# Patient Record
Sex: FEMALE | Race: WHITE | NOT HISPANIC OR LATINO | URBAN - METROPOLITAN AREA
[De-identification: names, ages, dates, MRNs, and addresses within clinical notes are randomized per-mention and may not be internally consistent; named-entity substitution may affect disease eponyms.]

---

## 2022-02-21 ENCOUNTER — EMERGENCY (EMERGENCY)
Facility: HOSPITAL | Age: 72
LOS: 0 days | Discharge: HOME | End: 2022-02-21
Attending: EMERGENCY MEDICINE
Payer: MEDICARE

## 2022-02-21 VITALS
RESPIRATION RATE: 18 BRPM | SYSTOLIC BLOOD PRESSURE: 172 MMHG | TEMPERATURE: 98 F | OXYGEN SATURATION: 100 % | HEIGHT: 63 IN | WEIGHT: 110.01 LBS | DIASTOLIC BLOOD PRESSURE: 100 MMHG | HEART RATE: 89 BPM

## 2022-02-21 DIAGNOSIS — K21.9 GASTRO-ESOPHAGEAL REFLUX DISEASE WITHOUT ESOPHAGITIS: ICD-10-CM

## 2022-02-21 DIAGNOSIS — R07.89 OTHER CHEST PAIN: ICD-10-CM

## 2022-02-21 DIAGNOSIS — R06.02 SHORTNESS OF BREATH: ICD-10-CM

## 2022-02-21 DIAGNOSIS — Z20.822 CONTACT WITH AND (SUSPECTED) EXPOSURE TO COVID-19: ICD-10-CM

## 2022-02-21 LAB
ALBUMIN SERPL ELPH-MCNC: 3.8 G/DL — SIGNIFICANT CHANGE UP (ref 3.3–5)
ALP SERPL-CCNC: 38 U/L — LOW (ref 40–120)
ALT FLD-CCNC: 21 U/L — SIGNIFICANT CHANGE UP (ref 12–78)
ANION GAP SERPL CALC-SCNC: 6 MMOL/L — SIGNIFICANT CHANGE UP (ref 5–17)
AST SERPL-CCNC: 14 U/L — LOW (ref 15–37)
BASOPHILS # BLD AUTO: 0.04 K/UL — SIGNIFICANT CHANGE UP (ref 0–0.2)
BASOPHILS NFR BLD AUTO: 0.6 % — SIGNIFICANT CHANGE UP (ref 0–2)
BILIRUB SERPL-MCNC: 0.6 MG/DL — SIGNIFICANT CHANGE UP (ref 0.2–1.2)
BUN SERPL-MCNC: 14 MG/DL — SIGNIFICANT CHANGE UP (ref 7–23)
CALCIUM SERPL-MCNC: 9.2 MG/DL — SIGNIFICANT CHANGE UP (ref 8.5–10.1)
CHLORIDE SERPL-SCNC: 93 MMOL/L — LOW (ref 96–108)
CO2 SERPL-SCNC: 28 MMOL/L — SIGNIFICANT CHANGE UP (ref 22–31)
CREAT SERPL-MCNC: 0.6 MG/DL — SIGNIFICANT CHANGE UP (ref 0.5–1.3)
EOSINOPHIL # BLD AUTO: 0.06 K/UL — SIGNIFICANT CHANGE UP (ref 0–0.5)
EOSINOPHIL NFR BLD AUTO: 0.8 % — SIGNIFICANT CHANGE UP (ref 0–6)
GLUCOSE SERPL-MCNC: 106 MG/DL — HIGH (ref 70–99)
HCT VFR BLD CALC: 37 % — SIGNIFICANT CHANGE UP (ref 34.5–45)
HGB BLD-MCNC: 12.7 G/DL — SIGNIFICANT CHANGE UP (ref 11.5–15.5)
IMM GRANULOCYTES NFR BLD AUTO: 0.3 % — SIGNIFICANT CHANGE UP (ref 0–1.5)
LYMPHOCYTES # BLD AUTO: 2.36 K/UL — SIGNIFICANT CHANGE UP (ref 1–3.3)
LYMPHOCYTES # BLD AUTO: 32.6 % — SIGNIFICANT CHANGE UP (ref 13–44)
MAGNESIUM SERPL-MCNC: 2.1 MG/DL — SIGNIFICANT CHANGE UP (ref 1.6–2.6)
MCHC RBC-ENTMCNC: 30.5 PG — SIGNIFICANT CHANGE UP (ref 27–34)
MCHC RBC-ENTMCNC: 34.3 GM/DL — SIGNIFICANT CHANGE UP (ref 32–36)
MCV RBC AUTO: 88.7 FL — SIGNIFICANT CHANGE UP (ref 80–100)
MONOCYTES # BLD AUTO: 0.58 K/UL — SIGNIFICANT CHANGE UP (ref 0–0.9)
MONOCYTES NFR BLD AUTO: 8 % — SIGNIFICANT CHANGE UP (ref 2–14)
NEUTROPHILS # BLD AUTO: 4.18 K/UL — SIGNIFICANT CHANGE UP (ref 1.8–7.4)
NEUTROPHILS NFR BLD AUTO: 57.7 % — SIGNIFICANT CHANGE UP (ref 43–77)
PLATELET # BLD AUTO: 261 K/UL — SIGNIFICANT CHANGE UP (ref 150–400)
POTASSIUM SERPL-MCNC: 3.8 MMOL/L — SIGNIFICANT CHANGE UP (ref 3.5–5.3)
POTASSIUM SERPL-SCNC: 3.8 MMOL/L — SIGNIFICANT CHANGE UP (ref 3.5–5.3)
PROT SERPL-MCNC: 7.1 GM/DL — SIGNIFICANT CHANGE UP (ref 6–8.3)
RBC # BLD: 4.17 M/UL — SIGNIFICANT CHANGE UP (ref 3.8–5.2)
RBC # FLD: 12.5 % — SIGNIFICANT CHANGE UP (ref 10.3–14.5)
SODIUM SERPL-SCNC: 127 MMOL/L — LOW (ref 135–145)
TROPONIN I, HIGH SENSITIVITY RESULT: 3.82 NG/L — SIGNIFICANT CHANGE UP
TROPONIN I, HIGH SENSITIVITY RESULT: 5.78 NG/L — SIGNIFICANT CHANGE UP
WBC # BLD: 7.24 K/UL — SIGNIFICANT CHANGE UP (ref 3.8–10.5)
WBC # FLD AUTO: 7.24 K/UL — SIGNIFICANT CHANGE UP (ref 3.8–10.5)

## 2022-02-21 NOTE — ED STATDOCS - CLINICAL SUMMARY MEDICAL DECISION MAKING FREE TEXT BOX
Atypical CP. No significant cardiac factors. Will get 2 sets cardiac enzymes, EKG, CXR, and reassess. 09-Feb-2020 12:21

## 2022-02-21 NOTE — ED STATDOCS - OBJECTIVE STATEMENT
70 y/o female with a PMHx of GERD and OP presents to the ED c/o left sided exertional chest pressure since 02/16/2022. Pt reports CP was intermittent but has become constant today. Pressure is worse with exertion. Pt was able to see her PCP on 02/17 and had blood work, EKG, and CT which showed no PE. Denies SOB and any other symptoms. Denies smoking and illicit drug use. No other complaints at this time. PCP: Dr. Shaikh

## 2022-02-21 NOTE — ED ADULT TRIAGE NOTE - WEIGHT METHOD
Last Appointment   2/16/2021  Next Appointment  5/12/2021    Patient stopped cholesterol medication, muscle cramping still continuing. Please advise. stated

## 2022-02-21 NOTE — ED STATDOCS - PROGRESS NOTE DETAILS
72 yo female with osteoporosis, GERD presents with cp/sob x 1 month. Pt has been followed by her PMD and had labs, CTA chest, pulmonary functions tests performed which came back unremarkable. Pt was referred to pulmonology for incidental findings and has an appointment in April. Pt states she started to feel the pain today after lunch, which became constant, which prompted her visit. Will check labs, CXR, EKG, CEx2 and reeval. -Diego Matthews PA-C Labs including 2CE unremarkable, except slightly lower Na of 127. Discussed with pt. Advised to f/u with pmd and to obtain cardiologist that usually works within that group. Will recommend one as well. Pt to be d/c home. -Diego Matthews PA-C

## 2022-02-21 NOTE — ED STATDOCS - ATTENDING CONTRIBUTION TO CARE
I, Anibal Hoffmann, performed the initial face to face bedside interview with this patient regarding history of present illness, review of symptoms and relevant past medical, social and family history.  I completed an independent physical examination.  I was the initial provider who evaluated this patient. I have signed out the follow up of any pending tests (i.e. labs, radiological studies) to the ACP.  I have communicated the patient’s plan of care and disposition with the ACP.  The history, relevant review of systems, past medical and surgical history, medical decision making, and physical examination was documented by the scribe in my presence and I attest to the accuracy of the documentation.

## 2022-02-21 NOTE — ED ADULT NURSE NOTE - OBJECTIVE STATEMENT
Pt presents to the ER for evaluation of intermittent left chest pressure x 1 month, felt worse today. Denies SOB. No rx PTA. outpatient imaging performed which was negative for pulmonary embolus.

## 2022-02-21 NOTE — ED STATDOCS - PATIENT PORTAL LINK FT
You can access the FollowMyHealth Patient Portal offered by [facility] by registering at the following website: [email or web address]. By joining [employer]’s FollowMyHealth portal, you will also be able to view your health information using other applications (apps) compatible with our system.

## 2022-02-21 NOTE — ED ADULT TRIAGE NOTE - CHIEF COMPLAINT QUOTE
Pt presents to the ED c/o intermittent left chest pressure for about a month, worsening today. Denies SOB. Denies taking medications PTA. Pt had CT scan outpatient that showed no evidence of PE.

## 2022-02-22 LAB — SARS-COV-2 RNA SPEC QL NAA+PROBE: SIGNIFICANT CHANGE UP

## 2022-11-15 ENCOUNTER — INPATIENT (INPATIENT)
Facility: HOSPITAL | Age: 72
LOS: 3 days | Discharge: HOME | DRG: 315 | End: 2022-11-19
Attending: HOSPITALIST | Admitting: HOSPITALIST
Payer: MEDICARE

## 2022-11-15 VITALS — HEIGHT: 63 IN | WEIGHT: 111.99 LBS

## 2022-11-15 DIAGNOSIS — Z98.890 OTHER SPECIFIED POSTPROCEDURAL STATES: Chronic | ICD-10-CM

## 2022-11-15 DIAGNOSIS — R00.0 TACHYCARDIA, UNSPECIFIED: ICD-10-CM

## 2022-11-15 PROBLEM — K21.9 GASTRO-ESOPHAGEAL REFLUX DISEASE WITHOUT ESOPHAGITIS: Chronic | Status: ACTIVE | Noted: 2022-02-21

## 2022-11-15 LAB
ALBUMIN SERPL ELPH-MCNC: 2.8 G/DL — LOW (ref 3.3–5)
ALP SERPL-CCNC: 131 U/L — HIGH (ref 40–120)
ALT FLD-CCNC: 86 U/L — HIGH (ref 12–78)
ANION GAP SERPL CALC-SCNC: 4 MMOL/L — LOW (ref 5–17)
AST SERPL-CCNC: 24 U/L — SIGNIFICANT CHANGE UP (ref 15–37)
BASOPHILS # BLD AUTO: 0.02 K/UL — SIGNIFICANT CHANGE UP (ref 0–0.2)
BASOPHILS NFR BLD AUTO: 0.2 % — SIGNIFICANT CHANGE UP (ref 0–2)
BILIRUB SERPL-MCNC: 0.5 MG/DL — SIGNIFICANT CHANGE UP (ref 0.2–1.2)
BUN SERPL-MCNC: 13 MG/DL — SIGNIFICANT CHANGE UP (ref 7–23)
CALCIUM SERPL-MCNC: 9.2 MG/DL — SIGNIFICANT CHANGE UP (ref 8.5–10.1)
CHLORIDE SERPL-SCNC: 95 MMOL/L — LOW (ref 96–108)
CO2 SERPL-SCNC: 31 MMOL/L — SIGNIFICANT CHANGE UP (ref 22–31)
CREAT SERPL-MCNC: 0.61 MG/DL — SIGNIFICANT CHANGE UP (ref 0.5–1.3)
EGFR: 97 ML/MIN/1.73M2 — SIGNIFICANT CHANGE UP
EOSINOPHIL # BLD AUTO: 0.1 K/UL — SIGNIFICANT CHANGE UP (ref 0–0.5)
EOSINOPHIL NFR BLD AUTO: 1 % — SIGNIFICANT CHANGE UP (ref 0–6)
FLUAV AG NPH QL: SIGNIFICANT CHANGE UP
FLUBV AG NPH QL: SIGNIFICANT CHANGE UP
GLUCOSE SERPL-MCNC: 95 MG/DL — SIGNIFICANT CHANGE UP (ref 70–99)
HCT VFR BLD CALC: 28.4 % — LOW (ref 34.5–45)
HGB BLD-MCNC: 9.7 G/DL — LOW (ref 11.5–15.5)
IMM GRANULOCYTES NFR BLD AUTO: 0.8 % — SIGNIFICANT CHANGE UP (ref 0–0.9)
LYMPHOCYTES # BLD AUTO: 1.68 K/UL — SIGNIFICANT CHANGE UP (ref 1–3.3)
LYMPHOCYTES # BLD AUTO: 17.4 % — SIGNIFICANT CHANGE UP (ref 13–44)
MAGNESIUM SERPL-MCNC: 2.1 MG/DL — SIGNIFICANT CHANGE UP (ref 1.6–2.6)
MCHC RBC-ENTMCNC: 30.8 PG — SIGNIFICANT CHANGE UP (ref 27–34)
MCHC RBC-ENTMCNC: 34.2 GM/DL — SIGNIFICANT CHANGE UP (ref 32–36)
MCV RBC AUTO: 90.2 FL — SIGNIFICANT CHANGE UP (ref 80–100)
MONOCYTES # BLD AUTO: 1.1 K/UL — HIGH (ref 0–0.9)
MONOCYTES NFR BLD AUTO: 11.4 % — SIGNIFICANT CHANGE UP (ref 2–14)
NEUTROPHILS # BLD AUTO: 6.67 K/UL — SIGNIFICANT CHANGE UP (ref 1.8–7.4)
NEUTROPHILS NFR BLD AUTO: 69.2 % — SIGNIFICANT CHANGE UP (ref 43–77)
PLATELET # BLD AUTO: 288 K/UL — SIGNIFICANT CHANGE UP (ref 150–400)
POTASSIUM SERPL-MCNC: 4.1 MMOL/L — SIGNIFICANT CHANGE UP (ref 3.5–5.3)
POTASSIUM SERPL-SCNC: 4.1 MMOL/L — SIGNIFICANT CHANGE UP (ref 3.5–5.3)
PROT SERPL-MCNC: 6.4 GM/DL — SIGNIFICANT CHANGE UP (ref 6–8.3)
RBC # BLD: 3.15 M/UL — LOW (ref 3.8–5.2)
RBC # FLD: 13.2 % — SIGNIFICANT CHANGE UP (ref 10.3–14.5)
RSV RNA NPH QL NAA+NON-PROBE: SIGNIFICANT CHANGE UP
SARS-COV-2 RNA SPEC QL NAA+PROBE: SIGNIFICANT CHANGE UP
SODIUM SERPL-SCNC: 130 MMOL/L — LOW (ref 135–145)
TROPONIN I, HIGH SENSITIVITY RESULT: 616.89 NG/L — HIGH
TROPONIN I, HIGH SENSITIVITY RESULT: 654.14 NG/L — HIGH
TROPONIN I, HIGH SENSITIVITY RESULT: 681.2 NG/L — HIGH
WBC # BLD: 9.65 K/UL — SIGNIFICANT CHANGE UP (ref 3.8–10.5)
WBC # FLD AUTO: 9.65 K/UL — SIGNIFICANT CHANGE UP (ref 3.8–10.5)

## 2022-11-15 RX ORDER — SODIUM CHLORIDE 9 MG/ML
500 INJECTION INTRAMUSCULAR; INTRAVENOUS; SUBCUTANEOUS ONCE
Refills: 0 | Status: COMPLETED | OUTPATIENT
Start: 2022-11-15 | End: 2022-11-15

## 2022-11-15 RX ORDER — METOPROLOL TARTRATE 50 MG
25 TABLET ORAL
Refills: 0 | Status: DISCONTINUED | OUTPATIENT
Start: 2022-11-15 | End: 2022-11-16

## 2022-11-15 RX ORDER — DILTIAZEM HCL 120 MG
10 CAPSULE, EXT RELEASE 24 HR ORAL EVERY 4 HOURS
Refills: 0 | Status: DISCONTINUED | OUTPATIENT
Start: 2022-11-15 | End: 2022-11-19

## 2022-11-15 RX ORDER — METOPROLOL TARTRATE 50 MG
25 TABLET ORAL ONCE
Refills: 0 | Status: COMPLETED | OUTPATIENT
Start: 2022-11-15 | End: 2022-11-15

## 2022-11-15 RX ORDER — ACETAMINOPHEN 500 MG
650 TABLET ORAL EVERY 6 HOURS
Refills: 0 | Status: DISCONTINUED | OUTPATIENT
Start: 2022-11-15 | End: 2022-11-19

## 2022-11-15 RX ORDER — ASPIRIN/CALCIUM CARB/MAGNESIUM 324 MG
325 TABLET ORAL ONCE
Refills: 0 | Status: COMPLETED | OUTPATIENT
Start: 2022-11-15 | End: 2022-11-15

## 2022-11-15 RX ORDER — MAGNESIUM CARBONATE 54 MG/5 ML
1 LIQUID (ML) ORAL
Qty: 0 | Refills: 0 | DISCHARGE

## 2022-11-15 RX ORDER — APIXABAN 2.5 MG/1
2.5 TABLET, FILM COATED ORAL ONCE
Refills: 0 | Status: COMPLETED | OUTPATIENT
Start: 2022-11-15 | End: 2022-11-15

## 2022-11-15 RX ORDER — FAMOTIDINE 10 MG/ML
20 INJECTION INTRAVENOUS DAILY
Refills: 0 | Status: DISCONTINUED | OUTPATIENT
Start: 2022-11-15 | End: 2022-11-19

## 2022-11-15 RX ORDER — SENNA PLUS 8.6 MG/1
1 TABLET ORAL DAILY
Refills: 0 | Status: DISCONTINUED | OUTPATIENT
Start: 2022-11-15 | End: 2022-11-16

## 2022-11-15 RX ORDER — CHOLECALCIFEROL (VITAMIN D3) 125 MCG
1 CAPSULE ORAL
Qty: 0 | Refills: 0 | DISCHARGE

## 2022-11-15 RX ORDER — APIXABAN 2.5 MG/1
5 TABLET, FILM COATED ORAL
Refills: 0 | Status: DISCONTINUED | OUTPATIENT
Start: 2022-11-15 | End: 2022-11-19

## 2022-11-15 RX ORDER — FAMOTIDINE 10 MG/ML
1 INJECTION INTRAVENOUS
Qty: 0 | Refills: 0 | DISCHARGE

## 2022-11-15 RX ORDER — METOPROLOL TARTRATE 50 MG
25 TABLET ORAL
Refills: 0 | Status: DISCONTINUED | OUTPATIENT
Start: 2022-11-15 | End: 2022-11-15

## 2022-11-15 RX ORDER — METOPROLOL TARTRATE 50 MG
12.5 TABLET ORAL ONCE
Refills: 0 | Status: DISCONTINUED | OUTPATIENT
Start: 2022-11-15 | End: 2022-11-15

## 2022-11-15 RX ORDER — ONDANSETRON 8 MG/1
4 TABLET, FILM COATED ORAL EVERY 6 HOURS
Refills: 0 | Status: DISCONTINUED | OUTPATIENT
Start: 2022-11-15 | End: 2022-11-19

## 2022-11-15 RX ORDER — LANOLIN ALCOHOL/MO/W.PET/CERES
3 CREAM (GRAM) TOPICAL AT BEDTIME
Refills: 0 | Status: DISCONTINUED | OUTPATIENT
Start: 2022-11-15 | End: 2022-11-19

## 2022-11-15 RX ADMIN — APIXABAN 5 MILLIGRAM(S): 2.5 TABLET, FILM COATED ORAL at 21:03

## 2022-11-15 RX ADMIN — APIXABAN 2.5 MILLIGRAM(S): 2.5 TABLET, FILM COATED ORAL at 16:03

## 2022-11-15 RX ADMIN — Medication 25 MILLIGRAM(S): at 21:04

## 2022-11-15 NOTE — H&P ADULT - ASSESSMENT
Patient is a 71 y o female with a PMH of MGUS, osteoporosis and atrial myxoma s/p open heart surgery on 11/8/2022 presenting from Cardiologist office for atrial flutter and tachycardia.  Patients  is bedside and contributed to conversation.  She was at [facility] from 11/8/2022-11/14/2022, she had open heart surgery to remove atrial myxoma.  Today she was fatigued and had a HR of 124 prompting her to see Dr. Kaye.  She made an urgent appointment to see Dr. Kaye her cardiologist who found her to be in atrial flutter, gave her PO Metoprolol and sent her to the ED.  While in the ED, EKG showed sinus tachycardia and her HR has been 100-130s.  She was found to elevated troponins >600.  Dr. Kaye recommended Metoprolol 25 mg BID and Eliquis 2.5 mg BID.      1. Tachycardia, Atrial flutter  -Post op day 7 atrial myxoma removal at [facility]  - at home, given Metoprolol 25 PO by Dr. Kaye prior to ED  -HR 100s-130s in ED  -EKG: Sinus tachycardia @ 121 bpm, NAD, nml QTc 465, T wave inversion II, III, avf, V3, V6  -CXR unremarkable  -Troponin 654.14  -CHADs Vasc: 2  -Fu Cardiology consult  -Trend troponins  -AM EKG ordered  -Start patient on Metoprolol 25 mg BID  -Start patient on Eliquis 2.5 mg BID    2. Anemia  -Hgb 9.7 Hct: 28.4  -Iron, TIBC, ferritin ordered  -Likely due to recent surgical procedure    3. Hyponatremia  -Na 130, discharged from [place] with same level  -Repeat level in AM ordered    4. GERD  -Continue home medication Famotidine 20 mg QD    Code status: Full Code  Diet: Regular  No PT needs at this time Patient is a 71 y o female with a PMH of MGUS, osteoporosis and atrial myxoma s/p open heart surgery on 11/8/2022 presenting from Cardiologist office for atrial flutter and tachycardia.  Patients  is bedside and contributed to conversation.  She was at [facility] from 11/8/2022-11/14/2022, she had open heart surgery to remove atrial myxoma.  Today she was fatigued and had a HR of 124 prompting her to see Dr. Kaye.  She made an urgent appointment to see Dr. Kaye her cardiologist who found her to be in atrial flutter, gave her PO Metoprolol and sent her to the ED.  While in the ED, EKG showed sinus tachycardia and her HR has been 100-130s.  She was found to elevated troponins >600.  Dr. Kaye recommended Metoprolol 25 mg BID and Eliquis 2.5 mg BID.      1. Tachycardia, Atrial flutter  -Post op day 7 atrial myxoma removal at [facility]  - at home, given Metoprolol 25 PO by Dr. Kaye prior to ED  -HR 100s-130s in ED  -EKG: Sinus tachycardia @ 121 bpm, NAD, nml QTc 465, T wave inversion II, III, avf, V3, V6  -CXR unremarkable  -Troponin 654.14, 681  -CHADs Vasc: 2  -Appreciate Cardiology consult, spoke with attending and plan incorporated below  -Trend troponins  -AM EKG ordered  -Start patient on Metoprolol 25 mg BID  -Start patient on Eliquis 5 mg BID  -Planned Cardioversion in AM  -NPO after midnight    2. Anemia  -Hgb 9.7 Hct: 28.4  -Iron, TIBC, ferritin ordered  -Likely due to recent surgical procedure    3. Hyponatremia  -Na 130, discharged from [place] with same level  -Repeat level in AM ordered    4. GERD  -Continue home medication Famotidine 20 mg QD    Code status: Full Code  Diet: Regular  No PT needs at this time Patient is a 71 y o female with a PMH of MGUS, osteoporosis and atrial myxoma s/p open heart surgery on 11/8/2022 presenting from Cardiologist office for atrial flutter and tachycardia.  Patients  is bedside and contributed to conversation.  She was at [facility] from 11/8/2022-11/14/2022, she had open heart surgery to remove atrial myxoma.  Today she was fatigued and had a HR of 124 prompting her to see Dr. Kaye.  She made an urgent appointment to see Dr. Kaye her cardiologist who found her to be in atrial flutter, gave her PO Metoprolol and sent her to the ED.  While in the ED, EKG showed sinus tachycardia and her HR has been 100-130s.  She was found to elevated troponins >600.  Dr. Kaye recommended Metoprolol 25 mg BID and Eliquis 2.5 mg BID.      1. Tachycardia, Atrial flutter  -Post op day 7 atrial myxoma removal at [facility]  - at home, given Metoprolol 25 PO by Dr. Kaye prior to ED  -HR 100s-130s in ED  -EKG: Sinus tachycardia @ 121 bpm, NAD, nml QTc 465, T wave inversion II, III, avf, V3, V6  -CXR unremarkable  -Troponin 654.14, 681  -CHADs Vasc: 2  -Appreciate Cardiology consult, spoke with attending and plan incorporated below  -Trend troponins  -AM EKG ordered  -Start patient on Metoprolol 25 mg BID with holding parameters  -Cardizem 10 mg IV push for HR>130  -Start patient on Eliquis 5 mg BID  -Planned Cardioversion in AM  -NPO after midnight    2. Anemia  -Hgb 9.7 Hct: 28.4  -Iron, TIBC, ferritin ordered  -Likely due to recent surgical procedure    3. Hyponatremia  -Na 130, discharged from [place] with same level  -Repeat level in AM ordered    4. GERD  -Continue home medication Famotidine 20 mg QD    Code status: Full Code  Diet: Regular  No PT needs at this time Patient is a 71 y o female with a PMH of MGUS, osteoporosis and atrial myxoma s/p open heart surgery on 11/8/2022 presenting from Cardiologist office for atrial flutter and tachycardia.  Patients  is bedside and contributed to conversation.  She was at [facility] from 11/8/2022-11/14/2022, she had open heart surgery to remove atrial myxoma.  Today she was fatigued and had a HR of 124 prompting her to see Dr. Kaye.  She made an urgent appointment to see Dr. Kaye her cardiologist who found her to be in atrial flutter, gave her PO Metoprolol and sent her to the ED.  While in the ED, EKG showed sinus tachycardia and her HR has been 100-130s.  She was found to elevated troponins >600.  Dr. Kaye recommended Metoprolol 25 mg BID and Eliquis 2.5 mg BID.      1. Tachycardia, Atrial flutter  -Post op day 7 atrial myxoma removal at [facility]  - at home, given Metoprolol 25 PO by Dr. Kaye prior to ED  -HR 100s-130s in ED  -EKG: Sinus tachycardia @ 121 bpm, NAD, nml QTc 465, T wave inversion II, III, avf, V3, V6  -CXR unremarkable  -Troponin 654.14, 681  -CHADs Vasc: 2  -Appreciate Cardiology consult, spoke with attending and plan incorporated below  -Trend troponins  -AM EKG ordered  -Start patient on Metoprolol 25 mg BID with holding parameters  -Cardizem 10 mg IV push for HR>130  -Start patient on Eliquis 5 mg BID as patient does not meet criteria for lower dose, discussed with Dr. Kelly  -Planned Cardioversion in AM  -NPO after midnight    2. Anemia  -Hgb 9.7 Hct: 28.4  -Iron, TIBC, ferritin ordered  -Likely due to recent surgical procedure    3. Hyponatremia  -Na 130, discharged from [place] with same level  -Repeat level in AM ordered    4. GERD  -Continue home medication Famotidine 20 mg QD    Code status: Full Code  Diet: Regular  No PT needs at this time Patient is a 71 y o female with a PMH of MGUS, osteoporosis and atrial myxoma s/p open heart surgery on 11/8/2022 presenting from Cardiologist office for atrial flutter and tachycardia.  Patients  is bedside and contributed to conversation.  She was at [facility] from 11/8/2022-11/14/2022, she had open heart surgery to remove atrial myxoma.  Today she was fatigued and had a HR of 124 prompting her to see Dr. Kaye.  She made an urgent appointment to see Dr. Kaye her cardiologist who found her to be in atrial flutter, gave her PO Metoprolol and sent her to the ED.  While in the ED, EKG showed sinus tachycardia and her HR has been 100-130s.  She was found to elevated troponins >600 x 2.  Dr. Kaye recommended Metoprolol 25 mg BID and Eliquis 2.5 mg BID.  EKG shows Sinus tachycardia @ 121 bpm, NAD, nml QTc 465, T wave inversion II, III, avf, V3, V6.  Chads vasc score of 2.        1. Tachycardia, Atrial flutter  -Appreciate Cardiology consult, spoke with Dr. Kelly, recommended:  --Start patient on Metoprolol 25 mg BID with holding parameters  --Cardizem 10 mg IV push for HR>130  --Start patient on Eliquis 5 mg BID as patient does not meet criteria for lower dose  --Planned Cardioversion in AM  -NPO after midnight  -AM EKG ordered    2. Elevated troponin  -Trend troponins  -Likely due to recent open heart surgery or demand ischemia from atrial flutter  -Doubt ACS, patient denies chest pain, SOB, diaphoresis, and EKG is not indicative of ACS  -Cardiology input appreciated    2. Anemia  -Hgb 9.7 Hct: 28.4  -Iron, TIBC, ferritin ordered  -Likely due to recent surgical procedure    3. Hyponatremia  -Na 130, discharged from [place] with same level  -Patient asymptomatic  -Repeat level in AM ordered    4. GERD  -Continue home medication Famotidine 20 mg QD    Code status: Full Code  Diet: Regular  No PT needs at this time

## 2022-11-15 NOTE — ED PROVIDER NOTE - NSICDXPASTMEDICALHX_GEN_ALL_CORE_FT
PAST MEDICAL HISTORY:  GERD (gastroesophageal reflux disease)       Atrial myxoma     Osteoporosis

## 2022-11-15 NOTE — H&P ADULT - NSHPREVIEWOFSYSTEMS_GEN_ALL_CORE
REVIEW OF SYSTEMS:    CONSTITUTIONAL: No weakness, fevers or chills  EYES/ENT: No visual changes;  No vertigo or throat pain   NECK: No pain or stiffness  RESPIRATORY: No cough, wheezing, hemoptysis; No shortness of breath  CARDIOVASCULAR: No chest pain or palpitations  GASTROINTESTINAL: No abdominal or epigastric pain. No nausea, vomiting, or hematemesis; No diarrhea or constipation. No melena or hematochezia.  GENITOURINARY: No dysuria, frequency or hematuria  NEUROLOGICAL: No numbness or weakness  SKIN: No itching, rashes REVIEW OF SYSTEMS:    CONSTITUTIONAL: No weakness, fevers or chills  EYES/ENT: No visual changes;  No vertigo or throat pain   NECK: No pain or stiffness  RESPIRATORY: No cough, wheezing, hemoptysis; No shortness of breath  CARDIOVASCULAR: Elevated HR, No chest pain or palpitations  GASTROINTESTINAL: No abdominal or epigastric pain. No nausea, vomiting, or hematemesis; No diarrhea or constipation. No melena or hematochezia.  GENITOURINARY: No dysuria, frequency or hematuria  NEUROLOGICAL: No numbness or weakness  SKIN: bruises on R UE from AC injections

## 2022-11-15 NOTE — H&P ADULT - HISTORY OF PRESENT ILLNESS
Patient is a 71 y o female with a PMH of osteoporosis and atrial myxoma s/p open heart surgery done on 11/8/2022, discharged last night.    Dr. Braun  States to start the patient on metoprolol 25 mg twice daily and Eliquis 2.5 mg twice daily. Patient is a 71 y o female with a PMH of Igm MGUS, osteoporosis and atrial myxoma s/p open heart surgery done on 11/8/2022, discharged last night.      Patient had open heart surgery to remove atrial myxoma.  She states that her heart rate was more elevated than usual.  She was discharged yesterday, got home, took a shower.  124 hr at home. She became very fatigued this morning and HR was 124.  She made an appointment to goo see the cardiologist and was told that she was in afib.    Dr. Braun  States to start the patient on metoprolol 25 mg twice daily and Eliquis 2.5 mg twice daily.    Denies smoking, only drinks on occasion, Deneis drug use    Exercises every day avid walkers and hikers.  Diet: Low fat low carb vegetables fruits, tea/caffeine coffee in AM 2 coffee 3 cup of decaf tea daily.  3 glasses 12 ounces. water a day    Famotidine 20 AM, MVN with minerals, vit D 1000U daily ca 400 mg mg 250mg qd Patient is a 71 y o female with a PMH of MGUS, osteoporosis and atrial myxoma s/p open heart surgery on 11/8/2022 presenting from Cardiologist office for atrial flutter and tachycardia.  Patients  is bedside and contributed to conversation.  She was at [facility] from 11/8/2022-11/14/2022, she had open heart surgery to remove atrial myxoma and post surgical course included fluid overload status.  Patient states she had no complications post surgery and was discharged in stable condition.  She states that she woke up this morning and did not feel right.  Her  states that she looked so fatigued and he became concerned.  She had no other sxs at this time but noticed that her HR was 124.  She made an urgent appointment to see Dr. Kaye her cardiologist who found her to be in atrial flutter, gave her PO Metoprolol and sent her to the ED.  While in the ED, EKG showed sinus tachycardia and her HR has been 100-130s.  She was found to elevated troponins >600.  Dr. Kaye recommended Metoprolol 25 mg BID and Eliquis 2.5 mg BID.      She feels well at this time, and has no other complaints.  Labs reviewed from [place] on patients phone, she has labs available, and Na was 130 on discharge.  No troponins available for comparison.  She denies fever, chills, chest pain, SOB, dizziness, palpitations, weakness, nausea, vomiting, diarrhea, constipation, dysuria.        Exercises every day is an avid walker/ hikers.    Diet: Low fat low carb vegetables fruits, tea/caffeine coffee in AM 2 coffee 3 cup of decaf tea daily.  3 12 ounce glasses of water a day.  Smoking: Denies, Drugs: Denies, ETOH: occasional use   Patient is a 71 y o female with a PMH of MGUS, osteoporosis and atrial myxoma s/p open heart surgery on 11/8/2022 presenting from Cardiologist office for atrial flutter and tachycardia.  Patients  is bedside and contributed to conversation.  She was at [facility] from 11/8/2022-11/14/2022, she had open heart surgery to remove atrial myxoma and post surgical course included fluid overload status.  Patient states she had no complications post surgery and was discharged in stable condition.  She states that she woke up this morning and did not feel right.  Her  states that she looked so fatigued and he became concerned.  She had no other sxs at this time but noticed that her HR was 124.  She made an urgent appointment to see Dr. Kaye her cardiologist who found her to be in atrial flutter, gave her PO Metoprolol and sent her to the ED.  While in the ED, EKG showed sinus tachycardia and her HR has been 100-130s.  She was found to elevated troponins >600.  Per ED, Dr. Kaye recommended Metoprolol 25 mg BID and Eliquis 2.5 mg BID.      She feels well at this time, and has no other complaints.  Labs reviewed from [place] on patients phone, she has labs available, and Na was 130 on discharge.  No troponins available for comparison.  She denies fever, chills, chest pain, SOB, dizziness, palpitations, weakness, nausea, vomiting, diarrhea, constipation, dysuria.        Exercises every day is an avid walker/ hikers.    Diet: Low fat low carb vegetables fruits, tea/caffeine coffee in AM 2 coffee 3 cup of decaf tea daily.  3 12 ounce glasses of water a day.  Smoking: Denies, Drugs: Denies, ETOH: occasional use

## 2022-11-15 NOTE — ED ADULT NURSE NOTE - OBJECTIVE STATEMENT
pt presents to the ED stating she was sent in by her cardiologist for tachycardia. pt states she had open heart surgery last week. Pt was discharged from the hospital yesterday and went for a followup appt today. pt denies CP, SOB, dizziness at this time. Denies palpitations. Offers no other complaints at this time.

## 2022-11-15 NOTE — PATIENT PROFILE ADULT - FALL HARM RISK - HARM RISK INTERVENTIONS

## 2022-11-15 NOTE — H&P ADULT - NSHPOUTPATIENTPROVIDERS_GEN_ALL_CORE
Cardiologist Dr. Braun Cardiologist Dr. Braun  PCP: Dr Keila Shaikh  Pul: Dr. Kimball  Urology: Dr. Woods  Hematology/Onc: Dr. Chayo Rascon  Endo: Dr. Emil cope Cardiologist Dr. Braun  PCP: Dr Keila Shaikh  Pul: Dr. Kibmall  Urology: Dr. Woods  Hematology/Onc: Dr. Chayo Rascon  Rheum: Dr. Emil cope  Endo: Dr. Alina Meredith

## 2022-11-15 NOTE — ED PROVIDER NOTE - PROGRESS NOTE DETAILS
Miles Braun said pt had A-flutter in his office, wants a copy of EKG taken in ED then will call back. Dr. Braun  States to start the patient on metoprolol 25 mg twice daily and Eliquis 2.5 mg twice daily.  The patient has been admitted to the hospitalist Dr. Margaux Hoffmann, DO

## 2022-11-15 NOTE — PATIENT PROFILE ADULT - NSPROMEDSADMININFO_GEN_A_NUR
pt stated if pill is too big than she likes to cut in half and take it with apple sauce./no concerns

## 2022-11-15 NOTE — ED PROVIDER NOTE - OBJECTIVE STATEMENT
71 year old female with PMHx of osteoporosis and atrial myxoma s/p open heart surgery done on 11/8/2022, discharged last night. Pt was told to watch her heart rate: last night was 113, and this morning 124 so went to see Cardiologist Dr. Braun in office, where heart rate was in 130-140s. Dr. Braun said there is some arrythmia, gave PO Metroprolol, and sent pt into ED to have tachycardia controlled. Pt notes she has been SOB since surgery. Pt is not on ACs currently. Denies chest pain, fevers, chills, or any other complaints. Pt did not require a blood transfusion with surgery.

## 2022-11-15 NOTE — H&P ADULT - NSICDXPASTMEDICALHX_GEN_ALL_CORE_FT
PAST MEDICAL HISTORY:  Atrial myxoma s/p resection 11/08/2022    GERD (gastroesophageal reflux disease)     Osteoporosis     Thyroid nodule

## 2022-11-15 NOTE — H&P ADULT - NSHPPHYSICALEXAM_GEN_ALL_CORE
PHYSICAL EXAM:  GENERAL: NAD, lying in bed comfortably  HEAD:  Atraumatic, Normocephalic  EYES: EOMI, PERRLA, conjunctiva and sclera clear  ENT: Moist mucous membranes  NECK: Supple, No JVD  CHEST/LUNG: Clear to auscultation bilaterally; No rales, rhonchi, wheezing, or rubs. Unlabored respirations  HEART: Regular rate and rhythm; No murmurs, rubs, or gallops  ABDOMEN: Bowel sounds present; Soft, Nontender, Nondistended. No hepatomegaly  EXTREMITIES:  2+ Peripheral Pulses, brisk capillary refill. No clubbing, cyanosis, or edema  NERVOUS SYSTEM:  Alert & Oriented X3, speech clear. No deficits   MSK: FROM all 4 extremities, full and equal strength  SKIN: No rashes or lesions PHYSICAL EXAM:  GENERAL: NAD, lying in bed comfortably  HEAD:  Atraumatic, Normocephalic  EYES: PERRLA, conjunctiva and sclera clear  ENT: Moist mucous membranes  NECK: Supple, No JVD  CHEST/LUN cm well healing scar with Dermabond on top over sternum.  Clear to auscultation bilaterally; No rales, rhonchi, wheezing, or rubs. Unlabored respirations  HEART: Regular rate and rhythm; No murmurs, rubs, or gallops  ABDOMEN: 2 cm well healed scars, Bowel sounds present; Soft, Nontender, Nondistended. No hepatomegaly  EXTREMITIES:  2+ Peripheral Pulses, brisk capillary refill. No clubbing, cyanosis, or edema  NERVOUS SYSTEM:  Alert & Oriented X3, speech clear. No deficits   MSK: FROM all 4 extremities, full and equal strength  SKIN: healing bruises on RUE, No rashes or lesions PHYSICAL EXAM:  GENERAL: NAD, lying in bed comfortably  HEAD:  Atraumatic, Normocephalic  EYES: PERRLA, conjunctiva and sclera clear  ENT: Moist mucous membranes  NECK: Supple, No JVD  CHEST/LUN cm well healing scar with Dermabond on top over sternum.  Clear to auscultation bilaterally; No rales, rhonchi, wheezing, or rubs. Unlabored respirations  HEART: Tachycardic, Regular rhythm; No murmurs, rubs, or gallops  ABDOMEN: 2 cm well healed scars, Bowel sounds present; Soft, Nontender, Nondistended. No hepatomegaly  EXTREMITIES:  2+ Peripheral Pulses, brisk capillary refill. No clubbing, cyanosis, or edema  NERVOUS SYSTEM:  Alert & Oriented X3, speech clear. No deficits   MSK: FROM all 4 extremities, full and equal strength  SKIN: healing bruises on RUE, No rashes or lesions

## 2022-11-15 NOTE — PHARMACOTHERAPY INTERVENTION NOTE - COMMENTS
Medication history complete, reviewed medication with patient and  and bedside and confirmed with DrFirst.

## 2022-11-15 NOTE — ED ADULT TRIAGE NOTE - CHIEF COMPLAINT QUOTE
PT C/O "ARRHYTHMIA, HAD RECENT HEART SURGERY ON 11/8/2022,"  PT'S  EXHIBIT AGGRESSIVE BEHAVIOUR, STAFF ASKED PT TO WRITE HER INFORMATION,  STATES "YOU LISTEN YOU ME!"  CONTINUOUSLY SHOWED AGGRESSIVE MANNERS, HAD SECURITY TO GET INVOLVED TO DUE TO FEELING OF HARASSMENT FROM THE .  UPON ASKING THE PT TO TAKE OFF THE GLOVES TO CHECK THE PATIENT'S HR,  CONTINUED ON SAYING HER HANDS ARE COLD, SHE CANNOT TAKE HER GLOVES.  REQUESTED EKG UPON ARRIVAL TO ED.  UNABLE TO OBTAIN ADD'L INFORMATION DUE TO  AGGRESSIVENESS.

## 2022-11-15 NOTE — ED PROVIDER NOTE - CLINICAL SUMMARY MEDICAL DECISION MAKING FREE TEXT BOX
Plan: Cardiac workup to rule out enzyme deficiency, d-dimer to rule out PE, consult with pt's cardiologist, and give Metoprolol.

## 2022-11-15 NOTE — H&P ADULT - NSHPLABSRESULTS_GEN_ALL_CORE
LABS:               9.7    9.65  )-----------( 288      ( 15 Nov 2022 11:57 )             28.4     11-15    130<L>  |  95<L>  |  13  ----------------------------<  95  4.1   |  31  |  0.61    Ca    9.2      15 Nov 2022 11:57  Mg     2.1     11-15    TPro  6.4  /  Alb  2.8<L>  /  TBili  0.5  /  DBili  x   /  AST  24  /  ALT  86<H>  /  AlkPhos  131<H>  11-15      RADIOLOGY:  Xray Chest 1 View-PORTABLE IMMEDIATE 11.15.22    Heart size is within normal limits. Sternotomy is new since February 21   this year.  Lungs remain clear.  Clips in the right breast area again noted.  IMPRESSION: Sternotomy new since February. No acute finding.    EKG:      I have personally reviewed all lab results, imaging, and EKG results. LABS:               9.7    9.65  )-----------( 288      ( 15 Nov 2022 11:57 )             28.4     11-15    130<L>  |  95<L>  |  13  ----------------------------<  95  4.1   |  31  |  0.61    Ca    9.2      15 Nov 2022 11:57  Mg     2.1     11-15    TPro  6.4  /  Alb  2.8<L>  /  TBili  0.5  /  DBili  x   /  AST  24  /  ALT  86<H>  /  AlkPhos  131<H>  11-15      RADIOLOGY:  Xray Chest 1 View-PORTABLE IMMEDIATE 11.15.22    Heart size is within normal limits. Sternotomy is new since February 21   this year.  Lungs remain clear.  Clips in the right breast area again noted.  IMPRESSION: Sternotomy new since February. No acute finding.    EKG: Sinus tachycardia @ 121 bpm, NAD, nml QTc 465, T wave inversion II, III, avf, V3, V6    I have personally reviewed all lab results, imaging, and EKG results.

## 2022-11-16 ENCOUNTER — TRANSCRIPTION ENCOUNTER (OUTPATIENT)
Age: 72
End: 2022-11-16

## 2022-11-16 LAB
ADD ON TEST-SPECIMEN IN LAB: SIGNIFICANT CHANGE UP
ALBUMIN SERPL ELPH-MCNC: 3.2 G/DL — LOW (ref 3.3–5)
ALP SERPL-CCNC: 135 U/L — HIGH (ref 40–120)
ALT FLD-CCNC: 89 U/L — HIGH (ref 12–78)
ANION GAP SERPL CALC-SCNC: 6 MMOL/L — SIGNIFICANT CHANGE UP (ref 5–17)
AST SERPL-CCNC: 27 U/L — SIGNIFICANT CHANGE UP (ref 15–37)
BASOPHILS # BLD AUTO: 0.05 K/UL — SIGNIFICANT CHANGE UP (ref 0–0.2)
BASOPHILS NFR BLD AUTO: 0.5 % — SIGNIFICANT CHANGE UP (ref 0–2)
BILIRUB SERPL-MCNC: 0.6 MG/DL — SIGNIFICANT CHANGE UP (ref 0.2–1.2)
BUN SERPL-MCNC: 13 MG/DL — SIGNIFICANT CHANGE UP (ref 7–23)
CALCIUM SERPL-MCNC: 10.2 MG/DL — HIGH (ref 8.5–10.1)
CHLORIDE SERPL-SCNC: 97 MMOL/L — SIGNIFICANT CHANGE UP (ref 96–108)
CO2 SERPL-SCNC: 29 MMOL/L — SIGNIFICANT CHANGE UP (ref 22–31)
CREAT SERPL-MCNC: 0.64 MG/DL — SIGNIFICANT CHANGE UP (ref 0.5–1.3)
EGFR: 96 ML/MIN/1.73M2 — SIGNIFICANT CHANGE UP
EOSINOPHIL # BLD AUTO: 0.27 K/UL — SIGNIFICANT CHANGE UP (ref 0–0.5)
EOSINOPHIL NFR BLD AUTO: 2.6 % — SIGNIFICANT CHANGE UP (ref 0–6)
FERRITIN SERPL-MCNC: 210 NG/ML — HIGH (ref 15–150)
GLUCOSE SERPL-MCNC: 113 MG/DL — HIGH (ref 70–99)
HCT VFR BLD CALC: 30 % — LOW (ref 34.5–45)
HCV AB S/CO SERPL IA: 0.1 S/CO — SIGNIFICANT CHANGE UP (ref 0–0.99)
HCV AB SERPL-IMP: SIGNIFICANT CHANGE UP
HGB BLD-MCNC: 10.1 G/DL — LOW (ref 11.5–15.5)
IMM GRANULOCYTES NFR BLD AUTO: 1.3 % — HIGH (ref 0–0.9)
IRON SATN MFR SERPL: 13 % — LOW (ref 14–50)
IRON SATN MFR SERPL: 45 UG/DL — SIGNIFICANT CHANGE UP (ref 30–160)
LYMPHOCYTES # BLD AUTO: 2.11 K/UL — SIGNIFICANT CHANGE UP (ref 1–3.3)
LYMPHOCYTES # BLD AUTO: 20.4 % — SIGNIFICANT CHANGE UP (ref 13–44)
MCHC RBC-ENTMCNC: 30.7 PG — SIGNIFICANT CHANGE UP (ref 27–34)
MCHC RBC-ENTMCNC: 33.7 GM/DL — SIGNIFICANT CHANGE UP (ref 32–36)
MCV RBC AUTO: 91.2 FL — SIGNIFICANT CHANGE UP (ref 80–100)
MONOCYTES # BLD AUTO: 1.05 K/UL — HIGH (ref 0–0.9)
MONOCYTES NFR BLD AUTO: 10.1 % — SIGNIFICANT CHANGE UP (ref 2–14)
NEUTROPHILS # BLD AUTO: 6.74 K/UL — SIGNIFICANT CHANGE UP (ref 1.8–7.4)
NEUTROPHILS NFR BLD AUTO: 65.1 % — SIGNIFICANT CHANGE UP (ref 43–77)
PLATELET # BLD AUTO: 362 K/UL — SIGNIFICANT CHANGE UP (ref 150–400)
POTASSIUM SERPL-MCNC: 3.6 MMOL/L — SIGNIFICANT CHANGE UP (ref 3.5–5.3)
POTASSIUM SERPL-SCNC: 3.6 MMOL/L — SIGNIFICANT CHANGE UP (ref 3.5–5.3)
PROT SERPL-MCNC: 7.1 GM/DL — SIGNIFICANT CHANGE UP (ref 6–8.3)
RBC # BLD: 3.29 M/UL — LOW (ref 3.8–5.2)
RBC # FLD: 13.2 % — SIGNIFICANT CHANGE UP (ref 10.3–14.5)
SODIUM SERPL-SCNC: 132 MMOL/L — LOW (ref 135–145)
TIBC SERPL-MCNC: 335 UG/DL — SIGNIFICANT CHANGE UP (ref 220–430)
TROPONIN I, HIGH SENSITIVITY RESULT: 549.24 NG/L — HIGH
UIBC SERPL-MCNC: 291 UG/DL — SIGNIFICANT CHANGE UP (ref 110–370)
WBC # BLD: 10.35 K/UL — SIGNIFICANT CHANGE UP (ref 3.8–10.5)
WBC # FLD AUTO: 10.35 K/UL — SIGNIFICANT CHANGE UP (ref 3.8–10.5)

## 2022-11-16 RX ORDER — APIXABAN 2.5 MG/1
1 TABLET, FILM COATED ORAL
Qty: 60 | Refills: 0
Start: 2022-11-16

## 2022-11-16 RX ORDER — POLYETHYLENE GLYCOL 3350 17 G/17G
17 POWDER, FOR SOLUTION ORAL DAILY
Refills: 0 | Status: DISCONTINUED | OUTPATIENT
Start: 2022-11-16 | End: 2022-11-19

## 2022-11-16 RX ORDER — CHOLECALCIFEROL (VITAMIN D3) 125 MCG
1000 CAPSULE ORAL DAILY
Refills: 0 | Status: DISCONTINUED | OUTPATIENT
Start: 2022-11-16 | End: 2022-11-19

## 2022-11-16 RX ORDER — AMIODARONE HYDROCHLORIDE 400 MG/1
200 TABLET ORAL EVERY 8 HOURS
Refills: 0 | Status: DISCONTINUED | OUTPATIENT
Start: 2022-11-16 | End: 2022-11-17

## 2022-11-16 RX ORDER — METHYLPREDNISOLONE 4 MG
250 TABLET ORAL DAILY
Refills: 0 | Status: DISCONTINUED | OUTPATIENT
Start: 2022-11-16 | End: 2022-11-16

## 2022-11-16 RX ORDER — AMIODARONE HYDROCHLORIDE 400 MG/1
TABLET ORAL
Refills: 0 | Status: DISCONTINUED | OUTPATIENT
Start: 2022-11-16 | End: 2022-11-17

## 2022-11-16 RX ORDER — METOPROLOL TARTRATE 50 MG
12.5 TABLET ORAL
Refills: 0 | Status: DISCONTINUED | OUTPATIENT
Start: 2022-11-16 | End: 2022-11-19

## 2022-11-16 RX ORDER — SENNA PLUS 8.6 MG/1
2 TABLET ORAL AT BEDTIME
Refills: 0 | Status: DISCONTINUED | OUTPATIENT
Start: 2022-11-16 | End: 2022-11-19

## 2022-11-16 RX ORDER — MAGNESIUM OXIDE 400 MG ORAL TABLET 241.3 MG
200 TABLET ORAL DAILY
Refills: 0 | Status: DISCONTINUED | OUTPATIENT
Start: 2022-11-16 | End: 2022-11-19

## 2022-11-16 RX ADMIN — AMIODARONE HYDROCHLORIDE 200 MILLIGRAM(S): 400 TABLET ORAL at 14:35

## 2022-11-16 RX ADMIN — FAMOTIDINE 20 MILLIGRAM(S): 10 INJECTION INTRAVENOUS at 14:35

## 2022-11-16 RX ADMIN — APIXABAN 5 MILLIGRAM(S): 2.5 TABLET, FILM COATED ORAL at 21:50

## 2022-11-16 RX ADMIN — APIXABAN 5 MILLIGRAM(S): 2.5 TABLET, FILM COATED ORAL at 08:43

## 2022-11-16 RX ADMIN — SENNA PLUS 2 TABLET(S): 8.6 TABLET ORAL at 21:51

## 2022-11-16 RX ADMIN — POLYETHYLENE GLYCOL 3350 17 GRAM(S): 17 POWDER, FOR SOLUTION ORAL at 21:51

## 2022-11-16 RX ADMIN — AMIODARONE HYDROCHLORIDE 200 MILLIGRAM(S): 400 TABLET ORAL at 21:50

## 2022-11-16 RX ADMIN — Medication 12.5 MILLIGRAM(S): at 17:42

## 2022-11-16 RX ADMIN — MAGNESIUM OXIDE 400 MG ORAL TABLET 200 MILLIGRAM(S): 241.3 TABLET ORAL at 15:04

## 2022-11-16 NOTE — PROGRESS NOTE ADULT - ASSESSMENT
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 Braun's office for further management of rapid atrial flutter. She awoke from sleep on 11/15 with new sense of profound fatigue, had a HR of 124 on her watch, prompting her to see Dr. Kaye. At his office, she was noted to be in rapid atrial flutter, was given PO metoprolol and sent to [facility]. Here EKG AT flutter 107. She was given IV fluid, metoprolol tartrate, aspirin and Eliquis and admitted to Medicine.     Atrial tachycardia  S/P unsuccessful DCCV today. Started on amiodarone. On Eliquis.   - Continue amiodarone  - Continue Eliquis    Elevated troponin  No angina or CHF sx. Likely related to recent cardiac surgery though checked records at [facility] admission and she did not have troponin to compare. Alternatively, could be myocardial demand ischemia without infarction, in the setting of rapid atrial flutter.   - Continue to monitor    Normocytic anemia  Hgb ~10. Does not appear to have iron deficiency. Most likely is related to recent open heart surgery.   - Continue to monitor    Hyponatremia  Na 130 upon admission here, similar at [facility]. Asymptomatic from this. Today's Na 132.   - Continue to trend.     Hypercalcemia  Ca 10.2, corrected for albumin would be a bit higher.   - Will continue to trend. Mild and appears to be asymptomatic.     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 Braun's office for further management of rapid atrial flutter. She awoke from sleep on 11/15 with new sense of profound fatigue, had a HR of 124 on her watch, prompting her to see Dr. Kaye. At his office, she was noted to be in rapid atrial flutter, was given PO metoprolol and sent to [facility]. Here EKG AT flutter 107. She was given IV fluid, metoprolol tartrate, aspirin and Eliquis and admitted to Medicine.     Atrial tachycardia  S/P unsuccessful DCCV today. Started on amiodarone. On Eliquis.   - Continue amiodarone  - Continue Eliquis    Elevated troponin  No angina or CHF sx. Likely related to recent cardiac surgery though checked records at [facility] admission and she did not have troponin to compare. Alternatively, could be myocardial demand ischemia without infarction, in the setting of rapid atrial flutter.   - Continue to monitor    Normocytic anemia  Hgb ~10. Does not appear to have iron deficiency. Most likely is related to recent open heart surgery.   - Continue to monitor    Hyponatremia  Na 130 upon admission here, similar at [facility]. Asymptomatic from this. Today's Na 132.   - Continue to trend.     Hypercalcemia  Ca 10.2, corrected for albumin would be a bit higher. Etiology unclear. Appears to be asymptomatic from this finding.   - Check Ca in the AM along with iCa, iPTH, 25-OH vit D    GERD  Stable   - Continue home medication famotidine 20 mg daily      Diet: Regular  DVT px: On Eliquis for aflutter  Code status: Full Code  Dispo: Home when clinically improved and inpatient workup is complete

## 2022-11-16 NOTE — CONSULT NOTE ADULT - SUBJECTIVE AND OBJECTIVE BOX
70 y/o female whose telemetry monitor reveals  AT Flutter and having pauses.   11/8 she had surgery at [place] and is s/o open heart surgery for atrial myxoma.  She was discharged 11/14 and took her heart rate from her watch it read 114-124 BPM. She went for urgent visit at Dr. Braun's office who gave her Metoprolol and has been on Eliquis.  She  presented  to  with complaints of SOB on 11/15.  She was found top be in AT Fl and had a MOHINI/CV.  MOHINI showed echogenic mass which was unlikely to be a vegetation.  She   Troponin Level 654    Presently is in At Flutter and is having frequent pauses up to 5 seconds.  She denies fever, chills, chest pain, SOB, dizziness, palpitations, weakness, nausea, vomiting, diarrhea, constipation, dysuria.      PMH of MGUS, osteoporosis and atrial myxoma s/p open heart surgery on 11/8/2022 BID.          PAST MEDICAL & SURGICAL HISTORY:  MGUS  GERD (gastroesophageal reflux disease)  Osteoporosis  Atrial myxoma-s/p resection 11/08/2022  Thyroid nodule  History of open heart surgery-atrial myxoma removal 11/8/2022      Exercises every day is an avid walker/ hikers.    Diet: Low fat low carb vegetables fruits, tea/caffeine coffee in AM 2 coffee 3 cup of decaf tea daily.  3 12 ounce glasses of water a day.  Smoking: Denies, Drugs: Denies, ETOH: occasional use   (15 Nov 2022 15:41)        MEDICATIONS  (STANDING):  aMIOdarone    Tablet   Oral   aMIOdarone    Tablet 200 milliGRAM(s) Oral every 8 hours  apixaban 5 milliGRAM(s) Oral two times a day  famotidine    Tablet 20 milliGRAM(s) Oral daily    MEDICATIONS  (PRN):  acetaminophen     Tablet .. 650 milliGRAM(s) Oral every 6 hours PRN Temp greater or equal to 38C (100.4F), Mild Pain (1 - 3)  aluminum hydroxide/magnesium hydroxide/simethicone Suspension 30 milliLiter(s) Oral every 4 hours PRN Dyspepsia  bisacodyl 5 milliGRAM(s) Oral every 12 hours PRN Constipation  diltiazem Injectable 10 milliGRAM(s) IV Push every 4 hours PRN HR>130  melatonin 3 milliGRAM(s) Oral at bedtime PRN Insomnia  ondansetron Injectable 4 milliGRAM(s) IV Push every 6 hours PRN Nausea and/or Vomiting  senna 1 Tablet(s) Oral daily PRN Constipation      Allergies    No Known Allergies    Intolerances      Vital Signs Last 24 Hrs  T(C): 36.3 (16 Nov 2022 12:06), Max: 36.9 (16 Nov 2022 05:30)  T(F): 97.3 (16 Nov 2022 12:06), Max: 98.5 (16 Nov 2022 05:30)  HR: 124 (16 Nov 2022 12:06) (118 - 124)  BP: 102/72 (16 Nov 2022 12:06) (85/53 - 123/71)  BP(mean): 98 (15 Nov 2022 14:57) (98 - 98)  RR: 16 (16 Nov 2022 12:06) (16 - 18)  SpO2: 98% (16 Nov 2022 12:06) (98% - 100%)    Parameters below as of 16 Nov 2022 12:06  Patient On (Oxygen Delivery Method): room air        REVIEW OF SYSTEMS:    CONSTITUTIONAL:  As per HPI.  HEENT:  Eyes:  No diplopia or blurred vision. ENT:  No earache, sore throat or runny nose.  CARDIOVASCULAR:  No pressure, squeezing, strangling, tightness, heaviness or aching about the chest, neck, axilla or epigastrium.  RESPIRATORY:  No cough, shortness of breath, PND or orthopnea.  GASTROINTESTINAL:  No nausea, vomiting or diarrhea.  GENITOURINARY:  No dysuria, frequency or urgency.  MUSCULOSKELETAL:  As per HPI.  SKIN:  No change in skin, hair or nails.  NEUROLOGIC:  No paresthesias, fasciculations, seizures or weakness.  PSYCHIATRIC:  No disorder of thought or mood.  ENDOCRINE:  No heat or cold intolerance, polyuria or polydipsia.  HEMATOLOGICAL:  No easy bruising or bleedings:  .     PHYSICAL EXAMINATION:    GENERAL APPEARANCE:  Pt. is not currently dyspneic, in no distress. Pt. is alert, oriented, and pleasant.  HEENT:  Pupils are normal and react normally. No icterus. Mucous membranes well colored.  NECK:  Supple. No lymphadenopathy. Jugular venous pressure not elevated. Carotids equal.   HEART:   The cardiac impulse has a normal quality. There are no murmurs, rubs or gallops noted  CHEST:  Chest is clear to auscultation. Normal respiratory effort.  ABDOMEN:  Soft and nontender.   EXTREMITIES:  There is no edema.   SKIN:  No rash or significant lesions are noted.    I&O's Summary    15 Nov 2022 07:01  -  16 Nov 2022 07:00  --------------------------------------------------------  IN: 240 mL / OUT: 0 mL / NET: 240 mL        LABS:                        10.1   10.35 )-----------( 362      ( 16 Nov 2022 06:42 )             30.0     11-16    132<L>  |  97  |  13  ----------------------------<  113<H>  3.6   |  29  |  0.64    Ca    10.2<H>      16 Nov 2022 06:42  Mg     2.1     11-15    TPro  7.1  /  Alb  3.2<L>  /  TBili  0.6  /  DBili  x   /  AST  27  /  ALT  89<H>  /  AlkPhos  135<H>  11-16    LIVER FUNCTIONS - ( 16 Nov 2022 06:42 )  Alb: 3.2 g/dL / Pro: 7.1 gm/dL / ALK PHOS: 135 U/L / ALT: 89 U/L / AST: 27 U/L / GGT: x                       EKG:    TELEMETRY:    CARDIAC TESTS:    RADIOLOGY & ADDITIONAL STUDIES:     Summary     The left atrium appears normal.   No thrombus seen in the left atrial or left atrial appendage. Normal GLO   velocity.   There is a small, echogenic, fixed mass noted on the left atrial side,   likely postop changes from recent myxoma excision and less likely   thrombus.   A pericardial effusion is not present.   Estimated left ventricular ejection fraction is 60 %. There is a small,   mobile, echogenic mass in the left ventricular outflow tract, attached to   the ventricular septum, possibly representing a partial redundant   membrane. No turbulence noted. No aortic regurgitation noted.   The patient underwent synchronized cardioversion at 150 J x 1, and 200 J   x   2. Sinus rhythm was restored and with each attempt, however not maintain.   Recommend medical therapy with anticoagulation and antiarrhythmic   therapy.     Signature      ASSESSMENT & PLAN:      Thank-you for letting the EP Service  participate in the care of your patient.  70 y/o female whose telemetry monitor reveals  AT Flutter and having pauses.   11/8 she had surgery at [facility] and is s/o open heart surgery for atrial myxoma.  She was discharged 11/14 and took her heart rate from her watch it read 114-124 BPM. She went for urgent visit at Dr. Braun's office who gave her Metoprolol and has been on Eliquis.  She  presented  to  with complaints of SOB on 11/15.  She was found top be in AT Fl and had a MOHINI/CV.  MOHINI showed echogenic mass which was unlikely to be a vegetation.  She   Troponin Level 654    Presently is in At Flutter and is having frequent pauses up to 5 seconds.  She denies fever, chills, chest pain, SOB, dizziness, palpitations, weakness, nausea, vomiting, diarrhea, constipation, dysuria.      PMH of MGUS, osteoporosis and atrial myxoma s/p open heart surgery on 11/8/2022 BID.          PAST MEDICAL & SURGICAL HISTORY:  MGUS  GERD (gastroesophageal reflux disease)  Osteoporosis  Atrial myxoma-s/p resection 11/08/2022  Thyroid nodule  History of open heart surgery-atrial myxoma removal 11/8/2022      Exercises every day is an avid walker/ hikers.    Diet: Low fat low carb vegetables fruits, tea/caffeine coffee in AM 2 coffee 3 cup of decaf tea daily.  3 12 ounce glasses of water a day.  Smoking: Denies, Drugs: Denies, ETOH: occasional use   (15 Nov 2022 15:41)        MEDICATIONS  (STANDING):  aMIOdarone    Tablet   Oral   aMIOdarone    Tablet 200 milliGRAM(s) Oral every 8 hours  apixaban 5 milliGRAM(s) Oral two times a day  famotidine    Tablet 20 milliGRAM(s) Oral daily    MEDICATIONS  (PRN):  acetaminophen     Tablet .. 650 milliGRAM(s) Oral every 6 hours PRN Temp greater or equal to 38C (100.4F), Mild Pain (1 - 3)  aluminum hydroxide/magnesium hydroxide/simethicone Suspension 30 milliLiter(s) Oral every 4 hours PRN Dyspepsia  bisacodyl 5 milliGRAM(s) Oral every 12 hours PRN Constipation  diltiazem Injectable 10 milliGRAM(s) IV Push every 4 hours PRN HR>130  melatonin 3 milliGRAM(s) Oral at bedtime PRN Insomnia  ondansetron Injectable 4 milliGRAM(s) IV Push every 6 hours PRN Nausea and/or Vomiting  senna 1 Tablet(s) Oral daily PRN Constipation      Allergies    No Known Allergies    Intolerances      Vital Signs Last 24 Hrs  T(C): 36.3 (16 Nov 2022 12:06), Max: 36.9 (16 Nov 2022 05:30)  T(F): 97.3 (16 Nov 2022 12:06), Max: 98.5 (16 Nov 2022 05:30)  HR: 124 (16 Nov 2022 12:06) (118 - 124)  BP: 102/72 (16 Nov 2022 12:06) (85/53 - 123/71)  BP(mean): 98 (15 Nov 2022 14:57) (98 - 98)  RR: 16 (16 Nov 2022 12:06) (16 - 18)  SpO2: 98% (16 Nov 2022 12:06) (98% - 100%)    Parameters below as of 16 Nov 2022 12:06  Patient On (Oxygen Delivery Method): room air        REVIEW OF SYSTEMS:    CONSTITUTIONAL:  As per HPI.  HEENT:  Eyes:  No diplopia or blurred vision. ENT:  No earache, sore throat or runny nose.  CARDIOVASCULAR:  No pressure, squeezing, strangling, tightness, heaviness or aching about the chest, neck, axilla or epigastrium.  RESPIRATORY:  No cough, shortness of breath, PND or orthopnea.  GASTROINTESTINAL:  No nausea, vomiting or diarrhea.  GENITOURINARY:  No dysuria, frequency or urgency.  MUSCULOSKELETAL:  As per HPI.  SKIN:  No change in skin, hair or nails.  NEUROLOGIC:  No paresthesias, fasciculations, seizures or weakness.  PSYCHIATRIC:  No disorder of thought or mood.  ENDOCRINE:  No heat or cold intolerance, polyuria or polydipsia.  HEMATOLOGICAL:  No easy bruising or bleedings:  .     PHYSICAL EXAMINATION:    GENERAL APPEARANCE:  Pt. is not currently dyspneic, in no distress. Pt. is alert, oriented, and pleasant.  HEENT:  Pupils are normal and react normally. No icterus. Mucous membranes well colored.  NECK:  Supple. No lymphadenopathy. Jugular venous pressure not elevated. Carotids equal.   HEART:   The cardiac impulse has a normal quality. There are no murmurs, rubs or gallops noted  CHEST:  Chest is clear to auscultation. Normal respiratory effort.  ABDOMEN:  Soft and nontender.   EXTREMITIES:  There is no edema.   SKIN:  No rash or significant lesions are noted.    I&O's Summary    15 Nov 2022 07:01  -  16 Nov 2022 07:00  --------------------------------------------------------  IN: 240 mL / OUT: 0 mL / NET: 240 mL        LABS:                        10.1   10.35 )-----------( 362      ( 16 Nov 2022 06:42 )             30.0     11-16    132<L>  |  97  |  13  ----------------------------<  113<H>  3.6   |  29  |  0.64    Ca    10.2<H>      16 Nov 2022 06:42  Mg     2.1     11-15    TPro  7.1  /  Alb  3.2<L>  /  TBili  0.6  /  DBili  x   /  AST  27  /  ALT  89<H>  /  AlkPhos  135<H>  11-16    LIVER FUNCTIONS - ( 16 Nov 2022 06:42 )  Alb: 3.2 g/dL / Pro: 7.1 gm/dL / ALK PHOS: 135 U/L / ALT: 89 U/L / AST: 27 U/L / GGT: x               EKG:    TELEMETRY:  AT Flutter with  VR averaging 125 BPM with pauses up to 5 seconds, after pause accelerated junctional rhythm noted    CARDIAC TESTS:    RADIOLOGY & ADDITIONAL STUDIES:     Summary     The left atrium appears normal.   No thrombus seen in the left atrial or left atrial appendage. Normal GLO   velocity.   There is a small, echogenic, fixed mass noted on the left atrial side,   likely postop changes from recent myxoma excision and less likely   thrombus.   A pericardial effusion is not present.   Estimated left ventricular ejection fraction is 60 %. There is a small,   mobile, echogenic mass in the left ventricular outflow tract, attached to   the ventricular septum, possibly representing a partial redundant   membrane. No turbulence noted. No aortic regurgitation noted.   The patient underwent synchronized cardioversion at 150 J x 1, and 200 J   x   2. Sinus rhythm was restored and with each attempt, however not maintain.   Recommend medical therapy with anticoagulation and antiarrhythmic   therapy.     Signature             72 y/o female whose telemetry monitor reveals  AT Flutter and having pauses.   11/8 she had surgery at [place] and is s/o open heart surgery for atrial myxoma.  She was discharged 11/14 and took her heart rate from her watch it read 114-124 BPM. She went for urgent visit at Dr. Braun's office who gave her Metoprolol and has been on Eliquis.  She  presented  to  with complaints of SOB on 11/15.  She was found top be in AT Fl and had a MOHINI/CV.  MOHINI showed echogenic mass which was unlikely to be a vegetation.  She   Troponin Level 654    Presently is in At Flutter and is having frequent pauses up to 5 seconds.  She denies fever, chills, chest pain, SOB, dizziness, palpitations, weakness, nausea, vomiting, diarrhea, constipation, dysuria.      PMH of MGUS, osteoporosis and atrial myxoma s/p open heart surgery on 11/8/2022 BID.          PAST MEDICAL & SURGICAL HISTORY:  MGUS  GERD (gastroesophageal reflux disease)  Osteoporosis  Atrial myxoma-s/p resection 11/08/2022  Thyroid nodule  History of open heart surgery-atrial myxoma removal 11/8/2022      Exercises every day is an avid walker/ hikers.    Diet: Low fat low carb vegetables fruits, tea/caffeine coffee in AM 2 coffee 3 cup of decaf tea daily.  3 12 ounce glasses of water a day.  Smoking: Denies, Drugs: Denies, ETOH: occasional use   (15 Nov 2022 15:41)        MEDICATIONS  (STANDING):  aMIOdarone    Tablet   Oral   aMIOdarone    Tablet 200 milliGRAM(s) Oral every 8 hours  apixaban 5 milliGRAM(s) Oral two times a day  famotidine    Tablet 20 milliGRAM(s) Oral daily    MEDICATIONS  (PRN):  acetaminophen     Tablet .. 650 milliGRAM(s) Oral every 6 hours PRN Temp greater or equal to 38C (100.4F), Mild Pain (1 - 3)  aluminum hydroxide/magnesium hydroxide/simethicone Suspension 30 milliLiter(s) Oral every 4 hours PRN Dyspepsia  bisacodyl 5 milliGRAM(s) Oral every 12 hours PRN Constipation  diltiazem Injectable 10 milliGRAM(s) IV Push every 4 hours PRN HR>130  melatonin 3 milliGRAM(s) Oral at bedtime PRN Insomnia  ondansetron Injectable 4 milliGRAM(s) IV Push every 6 hours PRN Nausea and/or Vomiting  senna 1 Tablet(s) Oral daily PRN Constipation      Allergies    No Known Allergies    Intolerances      Vital Signs Last 24 Hrs  T(C): 36.3 (16 Nov 2022 12:06), Max: 36.9 (16 Nov 2022 05:30)  T(F): 97.3 (16 Nov 2022 12:06), Max: 98.5 (16 Nov 2022 05:30)  HR: 124 (16 Nov 2022 12:06) (118 - 124)  BP: 102/72 (16 Nov 2022 12:06) (85/53 - 123/71)  BP(mean): 98 (15 Nov 2022 14:57) (98 - 98)  RR: 16 (16 Nov 2022 12:06) (16 - 18)  SpO2: 98% (16 Nov 2022 12:06) (98% - 100%)    Parameters below as of 16 Nov 2022 12:06  Patient On (Oxygen Delivery Method): room air        REVIEW OF SYSTEMS:    CONSTITUTIONAL:  As per HPI.  HEENT:  Eyes:  No diplopia or blurred vision. ENT:  No earache, sore throat or runny nose.  CARDIOVASCULAR:  No pressure, squeezing, strangling, tightness, heaviness or aching about the chest, neck, axilla or epigastrium.  RESPIRATORY:  No cough, shortness of breath, PND or orthopnea.  GASTROINTESTINAL:  No nausea, vomiting or diarrhea.  GENITOURINARY:  No dysuria, frequency or urgency.  MUSCULOSKELETAL:  As per HPI.  SKIN:  No change in skin, hair or nails.  NEUROLOGIC:  No paresthesias, fasciculations, seizures or weakness.  PSYCHIATRIC:  No disorder of thought or mood.  ENDOCRINE:  No heat or cold intolerance, polyuria or polydipsia.  HEMATOLOGICAL:  No easy bruising or bleedings:  .     PHYSICAL EXAMINATION:    GENERAL APPEARANCE:  Pt. is not currently dyspneic, in no distress. Pt. is alert, oriented, and pleasant.  HEENT:  Pupils are normal and react normally. No icterus. Mucous membranes well colored.  NECK:  Supple. No lymphadenopathy. Jugular venous pressure not elevated. Carotids equal.   HEART:   The cardiac impulse has a normal quality. There are no murmurs, rubs or gallops noted  CHEST:  Chest is clear to auscultation. Normal respiratory effort.  ABDOMEN:  Soft and nontender.   EXTREMITIES:  There is no edema.   SKIN:  No rash or significant lesions are noted.    I&O's Summary    15 Nov 2022 07:01  -  16 Nov 2022 07:00  --------------------------------------------------------  IN: 240 mL / OUT: 0 mL / NET: 240 mL        LABS:                        10.1   10.35 )-----------( 362      ( 16 Nov 2022 06:42 )             30.0     11-16    132<L>  |  97  |  13  ----------------------------<  113<H>  3.6   |  29  |  0.64    Ca    10.2<H>      16 Nov 2022 06:42  Mg     2.1     11-15    TPro  7.1  /  Alb  3.2<L>  /  TBili  0.6  /  DBili  x   /  AST  27  /  ALT  89<H>  /  AlkPhos  135<H>  11-16    LIVER FUNCTIONS - ( 16 Nov 2022 06:42 )  Alb: 3.2 g/dL / Pro: 7.1 gm/dL / ALK PHOS: 135 U/L / ALT: 89 U/L / AST: 27 U/L / GGT: x               EKG:  AT Flutter at 107 BPM  with Qtc 469 ms    TELEMETRY:  AT Flutter with  VR averaging 125 BPM with pauses up to 5 seconds, after pause accelerated junctional rhythm noted    CARDIAC TESTS:    RADIOLOGY & ADDITIONAL STUDIES:     Summary     The left atrium appears normal.   No thrombus seen in the left atrial or left atrial appendage. Normal GLO   velocity.   There is a small, echogenic, fixed mass noted on the left atrial side,   likely postop changes from recent myxoma excision and less likely   thrombus.   A pericardial effusion is not present.   Estimated left ventricular ejection fraction is 60 %. There is a small,   mobile, echogenic mass in the left ventricular outflow tract, attached to   the ventricular septum, possibly representing a partial redundant   membrane. No turbulence noted. No aortic regurgitation noted.   The patient underwent synchronized cardioversion at 150 J x 1, and 200 J   x   2. Sinus rhythm was restored and with each attempt, however not maintain.   Recommend medical therapy with anticoagulation and antiarrhythmic   therapy.     Signature

## 2022-11-16 NOTE — DISCHARGE NOTE PROVIDER - NSDCCPCAREPLAN_GEN_ALL_CORE_FT
PRINCIPAL DISCHARGE DIAGNOSIS  Diagnosis: Atrial flutter with rapid ventricular response  Assessment and Plan of Treatment: You were admitted to the hospital for rapid atrial flutter. Cardioversion on 11/16 was unsuccessful. You were started on amiodarone thereafter. Started on Eliquis for stroke risk-reduction. You remained in rapid atrial flutter so amiodarone dose was increased as per Cardiology and Cardiac Electrophysiology. You were also treated with metoprolol. Heart Rate has since improved, now 80s and stable. Cardiology and Electrophysiology have advised that you are stable for disharge home to continue on amiodarone 400mg twice a day for 2 more days then 200mg daily. Continue metoprolol 12.5mg twice a day. Continue anticoagulation with Eliquis. Close outpatient follow-up with Dr. Braun. If you have recurrence of fatigue, palpitations, shortness of breath, chest pain or other concerning complaints, please return to the hospital and/or call your doctor.      SECONDARY DISCHARGE DIAGNOSES  Diagnosis: Elevated troponin  Assessment and Plan of Treatment: Reassuringly, you had no no angina, no signs of decompensated congestive heart failure. Mildly elevated troponin levels were likely either related to your recent cardiac surgery or alternatively, could be from demand on the heart due to your rapid heart rate that has since improved. No sign of myocardial infarction (heart attack). Please follow up with Dr. Braun.

## 2022-11-16 NOTE — PROGRESS NOTE ADULT - SUBJECTIVE AND OBJECTIVE BOX
Chief Complaint: Acute onset of profound fatigue    Interval Hx: Patient underwent MOHINI DCCV this AM without successful conversion to NSR. Now started on amiodarone. Patient seen and examined. OOB in chair. Resting comfortably. She denies fever, chills, chest pain, SOB, dizziness, palpitations, weakness, nausea, vomiting, diarrhea, constipation, dysuria.    ROS: Multi system review is comprehensively negative x 10 systems except as above    Vitals:  T(F): 97.3 (16 Nov 2022 12:06), Max: 98.5 (16 Nov 2022 05:30)  HR: 124 (16 Nov 2022 12:06) (118 - 124)  BP: 102/72 (16 Nov 2022 12:06) (96/60 - 123/71)  RR: 16 (16 Nov 2022 12:06) (16 - 18)  SpO2: 98% (16 Nov 2022 12:06) (98% - 100%) on room air    Exam:  Gen: No acute distress  HEENT: NCAT PERRL EOMI MMM clear oropharynx  Neck: Supple, no JVD, no LAD  CVS: s1 s2 normal, tachycardic HR 100s-120s, irregular  Chest: Median sternotomy scar, normal resp effort, lungs CTA B/L  Abd: +BS, soft NT ND   Ext: No edema or calf tenderness  Skin: Warm, dry  Neuro: A+Ox3, no gross focal deficits  Mood: Calm, pleasant    Labs:                        10.1   10.35 )--------( 362             30.0     MCV WNL   Iron 45   Iron sat 13%   TIBC 335   Ferritin 210      132  |  97  |  13  ----------------------<  113  3.6   |  29  |  0.64    Ca 10.2   Mg 2.1    TPro  7.1  /  Alb  3.2  /  TBili  0.6  /  DBili  x   /  AST  27  /  ALT  89  /  AlkPhos  135    Troponin 681, 617, 549    TSH 3.28   FT4 1.38    Micro:  COVID19 PCR 11/15: negative  Flu PCR 11/15: negative  Influenza PCR 11/15: negative      Imaging:  CXR 11/15: Heart size is within normal limits. Sternotomy is new since February 21 this year. Lungs remain clear. Clips in the right breast area again noted.    Cardiac Testing:  MOHINI/DCCV 11/16: The left atrium appears normal. No thrombus seen in the left atrial or left atrial appendage. Normal GLO velocity. There is a small, echogenic, fixed mass noted on the left atrial side, likely postop changes from recent myxoma excision and less likely thrombus. A pericardial effusion is not present. Estimated left ventricular ejection fraction is 60 %. There is a small, mobile, echogenic mass in the left ventricular outflow tract, attached to the ventricular septum, possibly representing a partial redundant membrane. No turbulence noted. No aortic regurgitation noted. The patient underwent synchronized cardioversion at 150 J x 1, and 200 J x 2. Sinus rhythm was restored and with each attempt, however not maintain.    EKG 11/15: Rapid atrial flutter    Meds:  MEDICATIONS  (STANDING):  aMIOdarone    Tablet 200 milliGRAM(s) Oral every 8 hours  apixaban 5 milliGRAM(s) Oral two times a day  cholecalciferol 1000 Unit(s) Oral daily  famotidine    Tablet 20 milliGRAM(s) Oral daily  magnesium oxide 200 milliGRAM(s) Oral daily  metoprolol tartrate 12.5 milliGRAM(s) Oral two times a day  multivitamin 1 Tablet(s) Oral daily  polyethylene glycol 3350 17 Gram(s) Oral daily  senna 2 Tablet(s) Oral at bedtime    MEDICATIONS  (PRN):  acetaminophen     Tablet .. 650 milliGRAM(s) Oral every 6 hours PRN Temp greater or equal to 38C (100.4F), Mild Pain (1 - 3)  aluminum hydroxide/magnesium hydroxide/simethicone Suspension 30 milliLiter(s) Oral every 4 hours PRN Dyspepsia  bisacodyl 5 milliGRAM(s) Oral every 12 hours PRN Constipation  diltiazem Injectable 10 milliGRAM(s) IV Push every 4 hours PRN HR>130  melatonin 3 milliGRAM(s) Oral at bedtime PRN Insomnia  ondansetron Injectable 4 milliGRAM(s) IV Push every 6 hours PRN Nausea and/or Vomiting

## 2022-11-16 NOTE — PHARMACOTHERAPY INTERVENTION NOTE - COMMENTS
As per [facility] pharmacy patients co-pay for one month supply of Eliquis $405.22 - patient most likely has a deductible - will speak to patient  As per [facility] pharmacy patients co-pay for one month supply of Eliquis $405.22 - patient most likely has a deductible.  Spoke to patient who is aware and agreeable to co-pay (she was provided w/ a free 1 month coupon), she will be enrolling into a new Medicare Part D plan for January.

## 2022-11-16 NOTE — CONSULT NOTE ADULT - ASSESSMENT
72 y/o female who under went removal of removal of atrial myxoma on 11/8/22. Saw Dr. Braun for urgent visit who started her on Metopolol 25 mgs bid and Eliquis 2.5 mgs as she was found in AT Fl.  Had MOHINI and CV on 11/15 Sinus Rhythm was restored- now in AT Flutter with RVR and pauses up to 5 seconds.  ECHO show LVEF of 60% with echogenic mass in the left ventricular outflow tract, attached to   the ventricular septum, possibly representing a partial redundant   membrane      Plan: to be discussed with Dr. Coello  Patient is a candidate  for MICRA PPM  Continue with Eliquis 5mgs bid   Continue to monitor this pt  Agree with discontinuing Metoprolol  Continue with Amiodarone  72 y/o female who under went removal of removal of atrial myxoma on 11/8/22. Saw Dr. Braun for urgent visit who started her on Metopolol 25 mgs bid and Eliquis 2.5 mgs and  she was found in AT Fl.  Had MOHINI and CV on 11/15 Sinus Rhythm was restored- now in AT Flutter with RVR and pauses up to 5 seconds followed by accelerated junctional rhythm .  Last dose of Metoprolol last night, last pause at 5:04 am on 11/16.  ECHO show LVEF of 60% with echogenic mass in the left ventricular outflow tract, attached to   the ventricular septum, possibly representing a partial redundant   membrane      Plan: to be discussed with Dr. Coello  Continue with Eliquis 5mgs bid   Continue to monitor this pt  Agree with discontinuing Metoprolol  Continue with Amiodarone  70 y/o female who under went removal of removal of atrial myxoma on 11/8/22. Saw Dr. Braun for urgent visit who started her on Metopolol 25 mgs bid and Eliquis 2.5 mgs and  she was found in AT Fl.  Had MOHINI and CV on 11/15 Sinus Rhythm was restored- now in AT Flutter with RVR and pauses up to 5 seconds followed by accelerated junctional rhythm .  Last dose of Metoprolol last night, last pause at 5:04 am on 11/16.  ECHO show LVEF of 60% with echogenic mass in the left ventricular outflow tract, attached to   the ventricular septum, possibly representing a partial redundant   membrane      Plan was  discussed with Dr. Coello  Continue with Eliquis 5mgs bid   Continue to monitor this pt  Agree with discontinuing Metoprolol  Continue with Amiodarone  72 y/o female who under went removal of removal of atrial myxoma on 11/8/22. Saw Dr. Braun for urgent visit who started her on Metopolol 25 mgs bid and Eliquis 2.5 mgs and  she was found in AT Fl.  Had MOHINI and CV on 11/15 Sinus Rhythm was restored- now in AT Flutter with RVR and pauses up to 5 seconds followed by accelerated junctional rhythm .  Last dose of Metoprolol last night, last pause at 5:04 am on 11/16.  ECHO show LVEF of 60% with echogenic mass in the left ventricular outflow tract, attached to   the ventricular septum, possibly representing a partial redundant   membrane      Plan was  discussed with Dr. Coello  Continue with Eliquis 5mgs bid   Continue to monitor this pt  Agree with discontinuing Metoprolol  Continue with Amiodarone   Information sheet on Amiodarone and potential adverse effects were discussed and patients questions answered    MCOT on dischage

## 2022-11-16 NOTE — DISCHARGE NOTE NURSING/CASE MANAGEMENT/SOCIAL WORK - NSDCPEFALRISK_GEN_ALL_CORE
For information on Fall & Injury Prevention, visit: [email or web address] OR  [email or web address] OR  https://www.cdc.gov/steadi/patient.html

## 2022-11-16 NOTE — PROCEDURE NOTE - ADDITIONAL PROCEDURE DETAILS
MOHINI DCCV Procedure Note  Indication: Afib / atrial flutter   The patient was brought to the transesophageal echo laboratory. Informed consent was obtained. The patient was seen by the anesthesiologist for MAC anesthesia.   The transesophageal probe was introduced into the posterior pharynx and esophagus without difficulty. Transesophageal images were then obtained.  The patient underwent synchronized cardioversion at 150 J x 1, and 200 J x 2.  Sinus rhythm was restored and with each attempt, however not maintained.    The patient tolerated the procedure well.  There were no procedural complications.  The patient was transferred to the recovery area in a stable condition.  Findings:  Recs: Preserved LVEF.  Mild valve disease.  No left atrial thrombus.  Postop changes in the left atrial septum.    Recommend medical therapy with anticoagulation and antiarrhythmic therapy.

## 2022-11-16 NOTE — DISCHARGE NOTE PROVIDER - HOSPITAL COURSE
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 Braun's office after she awoke on 11/15 with profound fatigue and found her HR to be 120s on her watch. At Dr. Braun's office she was noted to be in rapid atrial flutter, given PO metoprolol, and sent to [facility]. Here, EKG w/ AT/aflutter 107. She was given IV fluid, metoprolol tartrate, aspirin and Eliquis and admitted to Medicine.     Atrial flutter, atrial tachycardia  S/P unsuccessful DCCV 11/16. Started on amiodarone thereafter. On Eliquis for stroke risk reduction. Remained in rapid atrial flutter. Increased amiodarone 11/17 as per Cardiology and Cardiac EP. On metoprolol 12.5mg BID as well. HR has since improved, now 80s and stable. Seen by Cardiology and EP again. Stable for DC home on continued amiodarone 400mg BID for 2 days then 200mg daily. Close outpatient follow up with Dr. Braun.      Elevated troponin  No angina or CHF sx. Likely related to recent cardiac surgery though checked records at [facility] admission and she did not have troponin to compare. Alternatively, could be myocardial demand ischemia without infarction, in the setting of rapid atrial flutter. Unable to determine.     Recent L atrial myxoma resection  Stable. Follow up with Dr Alvarez and team, as advised by your surgical team    Normocytic anemia  Hgb ~10. Does not appear to have iron deficiency. Most likely is related to recent open heart surgery.     Hypotonic hyponatremia  Na 130 upon admission here, similar at [facility]. Asymptomatic from this. Yesterday, Na decreased 128 after a normal saline IV fluid challenge, suggesting SIADH. She was then started on fluid restriction and Na has since improved, now 132. TSH WNL.      Hypercalcemia  Resolved. Ca 10.2, corrected for albumin would be a bit higher. Etiology unclear. Appears to be asymptomatic from this finding. Repeat Ca WNL now.     GERD  Stable. Continue home medication famotidine 20 mg daily      Discharge Exam:  Afebrile  /70  HR 88  RR 18  O2 99% on RA  Gen: No acute distress  HEENT: NCAT PERRL EOMI MMM clear oropharynx  Neck: Supple, no JVD, no LAD  CVS: s1 s2 normal, HR 80s, regular  Chest: Median sternotomy scar, normal resp effort, lungs CTA B/L  Abd: +BS, soft NT ND   Ext: No edema or calf tenderness  Skin: Warm, dry  Neuro: A+Ox3, no gross focal deficits  Mood: Calm, pleasant    Labs:                        10.1   10.35 )--------( 362             30.0     MCV WNL   Iron 45   Iron sat 13%   TIBC 335   Ferritin 210               133  |  100  |  12  ---------------------<  114<H>  4.0   |  28  |  0.61    Ca 9.1  Phos 3.1   Mg 2.2    TPro  7.1  /  Alb  3.2  /  TBili  0.6  /  DBili  x   /  AST  27  /  ALT  89  /  AlkPhos  135    Troponin 681, 617, 549    TSH 3.28   FT4 1.38    Micro:  COVID19 PCR 11/15: negative  Flu PCR 11/15: negative  Influenza PCR 11/15: negative      Imaging:  CXR 11/15: Heart size is within normal limits. Sternotomy is new since February 21 this year. Lungs remain clear. Clips in the right breast area again noted.    Cardiac Testing:  Tele 11/19: Continued atrial flutter but improvement in rate, 80s at rest, up to 90s-100s with activity, no pauses    Tele 11/18: Continued atrial flutter, HRs 100s at rest, up to 120s-130s with light activity, no pauses    Tele 11/17: 2:1 atrial flutter    MOHINI/DCCV 11/16: The left atrium appears normal. No thrombus seen in the left atrial or left atrial appendage. Normal GLO velocity. There is a small, echogenic, fixed mass noted on the left atrial side, likely postop changes from recent myxoma excision and less likely thrombus. A pericardial effusion is not present. Estimated left ventricular ejection fraction is 60 %. There is a small, mobile, echogenic mass in the left ventricular outflow tract, attached to the ventricular septum, possibly representing a partial redundant membrane. No turbulence noted. No aortic regurgitation noted. The patient underwent synchronized cardioversion at 150 J x 1, and 200 J x 2. Sinus rhythm was restored and with each attempt, however not maintain.    EKG 11/15: Rapid atrial flutter

## 2022-11-16 NOTE — DISCHARGE NOTE PROVIDER - NSDCMRMEDTOKEN_GEN_ALL_CORE_FT
apixaban 5 mg oral tablet: 1 tab(s) orally 2 times a day  aspirin 81 mg oral tablet: 1 tab(s) orally once a day  Calcium 400m tab(s) orally once a day  famotidine 40 mg oral tablet: 1 tab(s) orally once a day  furosemide 20 mg oral tablet: 1 tab(s) orally once a day  magnesium carbonate 250 mg oral capsule: 1 cap(s) orally once a day  Multiple Vitamins oral tablet: 1 tab(s) orally once a day  Potassium Chloride (Eqv-Klor-Con 10) 10 mEq oral tablet, extended release: 1 tab(s) orally once a day  Vitamin D3 25 mcg (1000 intl units) oral tablet: 1 tab(s) orally once a day   amiodarone 200 mg oral tablet: 2 tabs orally twice a day for 2 more days then 1 tab daily.   apixaban 5 mg oral tablet: 1 tab(s) orally 2 times a day  Calcium 400m tab(s) orally once a day  famotidine 40 mg oral tablet: 1 tab(s) orally once a day  magnesium carbonate 250 mg oral capsule: 1 cap(s) orally once a day  Metoprolol Tartrate 25 mg oral tablet: 0.5 tab(s) orally 2 times a day   Multiple Vitamins oral tablet: 1 tab(s) orally once a day  polyethylene glycol 3350 oral powder for reconstitution: 17 gram(s) orally once a day  senna leaf extract oral tablet: 2 tab(s) orally once a day (at bedtime)  Vitamin D3 25 mcg (1000 intl units) oral tablet: 1 tab(s) orally once a day

## 2022-11-16 NOTE — DISCHARGE NOTE PROVIDER - CARE PROVIDER_API CALL
Jaciel Braun (MD)  Cardiovascular Disease; Internal Medicine  [address] [place] [address]  Phone: [phone]  Fax: [phone]  Follow Up Time: 1 week

## 2022-11-16 NOTE — CONSULT NOTE ADULT - SUBJECTIVE AND OBJECTIVE BOX
Patient is a 71y old  Female who presents with a chief complaint of Aflutter s/p recent heart surgery, Elevated Troponins (15 Nov 2022 15:41)    ________________________________  PHaseeb GIBSON is a 71y year old Female with a past medical history of  MGUS, osteoporosis and atrial myxoma s/p surgery on 2022 presenting from our office for atrial flutter and tachycardia.  Patients  is bedside and contributed to conversation.  She was at [facility] from 2022-2022, she had open heart surgery to remove atrial myxoma and post surgical course included fluid overload status.  Patient states she had no complications post surgery and was discharged in stable condition.  She states that she woke up this morning and did not feel right.  Her  states that she looked so fatigued and he became concerned.  She had no other sxs at this time but noticed that her HR was 124.  She made an urgent appointment to see Dr. Kaye her cardiologist who found her to be in atrial flutter, gave her PO Metoprolol and sent her to the ED.  While in the ED, EKG showed sinus tachycardia and her HR has been 100-130s.  She was found to elevated troponins >600.  Per ED, Dr. Kaye recommended Metoprolol 25 mg BID and Eliquis 2.5 mg BID.      She feels well at this time, and has no other complaints.  Labs reviewed from [place] on patients phone, she has labs available, and Na was 130 on discharge.  No troponins available for comparison.  She denies fever, chills, chest pain, SOB, dizziness, palpitations, weakness, nausea, vomiting, diarrhea, constipation, dysuria.         ________________________________  Review of systems: A 10 point review of system has been performed, and is negative except for what has been mentioned in the above history of present illness.     PAST MEDICAL & SURGICAL HISTORY:  GERD (gastroesophageal reflux disease)      Osteoporosis      Atrial myxoma  s/p resection 2022      Thyroid nodule      History of open heart surgery  atrial myxoma removal 2022        FAMILY HISTORY:  Known health problems: none (Father, Mother)         SOCIAL HISTORY: The patient denies any tobacco abuse, alcohol abuse or illicit drug use.    ALLERGIES:  No Known Allergies    Home Medications:  aspirin 81 mg oral tablet: 1 tab(s) orally once a day (15 Nov 2022 15:21)  Calcium 400m tab(s) orally once a day (15 Nov 2022 15:21)  famotidine 40 mg oral tablet: 1 tab(s) orally once a day (15 Nov 2022 15:21)  furosemide 20 mg oral tablet: 1 tab(s) orally once a day (15 Nov 2022 15:21)  magnesium carbonate 250 mg oral capsule: 1 cap(s) orally once a day (15 Nov 2022 15:21)  Multiple Vitamins oral tablet: 1 tab(s) orally once a day (15 Nov 2022 15:21)  Potassium Chloride (Eqv-Klor-Con 10) 10 mEq oral tablet, extended release: 1 tab(s) orally once a day (15 Nov 2022 15:21)  Vitamin D3 25 mcg (1000 intl units) oral tablet: 1 tab(s) orally once a day (15 Nov 2022 15:21)    MEDICATIONS  (STANDING):  apixaban 5 milliGRAM(s) Oral two times a day  famotidine    Tablet 20 milliGRAM(s) Oral daily  metoprolol tartrate 25 milliGRAM(s) Oral two times a day    MEDICATIONS  (PRN):  acetaminophen     Tablet .. 650 milliGRAM(s) Oral every 6 hours PRN Temp greater or equal to 38C (100.4F), Mild Pain (1 - 3)  aluminum hydroxide/magnesium hydroxide/simethicone Suspension 30 milliLiter(s) Oral every 4 hours PRN Dyspepsia  bisacodyl 5 milliGRAM(s) Oral every 12 hours PRN Constipation  diltiazem Injectable 10 milliGRAM(s) IV Push every 4 hours PRN HR>130  melatonin 3 milliGRAM(s) Oral at bedtime PRN Insomnia  ondansetron Injectable 4 milliGRAM(s) IV Push every 6 hours PRN Nausea and/or Vomiting  senna 1 Tablet(s) Oral daily PRN Constipation    Vital Signs Last 24 Hrs  T(C): 36.8 (2022 07:56), Max: 36.9 (2022 05:30)  T(F): 98.2 (2022 07:56), Max: 98.5 (2022 05:30)  HR: 124 (2022 07:56) (119 - 124)  BP: 105/62 (2022 07:56) (85/53 - 122/86)  BP(mean): 98 (15 Nov 2022 14:57) (91 - 98)  RR: 18 (2022 07:56) (16 - 18)  SpO2: 99% (2022 07:56) (96% - 100%)    Parameters below as of 2022 07:56  Patient On (Oxygen Delivery Method): room air      I&O's Summary    15 Nov 2022 07:01  -  2022 07:00  --------------------------------------------------------  IN: 240 mL / OUT: 0 mL / NET: 240 mL      ________________________________  GENERAL APPEARANCE:  No acute distress  HEAD: normocephalic, atraumatic  NECK: supple, no jugular venous distention, no carotid bruit    HEART: Regular rate and rhythm, S1, S2 normal, 1/6 murmur    CHEST:  No anterior chest wall tenderness    LUNGS:  Clear to auscultation, without any wheezing, rhonchi or rales    ABDOMEN: soft, nontender, nondistended, with positive bowel sounds appreciated  EXTREMITIES: no edema.   NEURO: Alert and oriented x3  PSYC:  Normal affect  SKIN:  Dry  ________________________________   TELEMETRY:    ECG:    LABS:                        10.1   10.35 )-----------( 362      ( 2022 06:42 )             30.0             11-16    132<L>  |  97  |  13  ----------------------------<  113<H>  3.6   |  29  |  0.64    Ca    10.2<H>      2022 06:42  Mg     2.1     11-15    TPro  7.1  /  Alb  3.2<L>  /  TBili  0.6  /  DBili  x   /  AST  27  /  ALT  89<H>  /  AlkPhos  135<H>        LIVER FUNCTIONS - ( 2022 06:42 )  Alb: 3.2 g/dL / Pro: 7.1 gm/dL / ALK PHOS: 135 U/L / ALT: 89 U/L / AST: 27 U/L / GGT: x            ________________________________    RADIOLOGY & ADDITIONAL STUDIES:   INTERPRETATION:  AP erect chest on November 15, 2022 at 12:22 PM. Patient   has chest pain.    Heart size is within normal limits. Sternotomy is new since    this year.    Lungs remain clear.    Clips in the right breast area again noted.    IMPRESSION: Sternotomy new since February. No acute finding.    ________________________________    ASSESSMENT:  Parox Afib/Flutter  Left atrial Myxoma s/p surg  Elevated trops - due to recent surg    PLAN:    DCCV today  Cont anticoagulation  Likely add amio after  ____________________________________________  (Dragon Dictation software used). Thank you for allowing me to participate in the care of your patient. Please contact me should any questions arise.    ELLIE Kelly DO, [facility]  Office: [phone]    Patient is a 71y old  Female who presents with a chief complaint of Aflutter s/p recent heart surgery, Elevated Troponins (15 Nov 2022 15:41)    ________________________________  PHaseeb GIBSON is a 71y year old Female with a past medical history of left atrial mass diagnosed earlier this year, subsequent MRI confirmed a mass which was determined to likely be a myxoma, for which she underwent surgery at [facility] on 2022, prior to which she had a cardiac CTA showing no coronary artery disease, history of MGUS, hyperlipidemia, GERD, and a dilated pulmonary artery.  She was seen in our office yesterday for fatigue.  She was noted to be in atrial flutter with rapid ventricular response, despite given oral amiodarone office patient sent to the ER for further evaluation.  On telemetry she has been in atrial fibrillation/atrial flutter with rapid ventricular response, with episodes of pauses and junctional escape beats.  Cardiac enzymes were mildly elevated but remained flat.  She denies any chest discomfort.  Her blood pressure is on the low side, which is close to her baseline.  She has pain at her sternotomy site.     ________________________________  Review of systems: A 10 point review of system has been performed, and is negative except for what has been mentioned in the above history of present illness.     PAST MEDICAL & SURGICAL HISTORY:  GERD (gastroesophageal reflux disease)      Osteoporosis      Atrial myxoma  s/p resection 2022      Thyroid nodule      History of open heart surgery  atrial myxoma removal 2022        FAMILY HISTORY:  Known health problems: none (Father, Mother)         SOCIAL HISTORY: The patient denies any tobacco abuse, alcohol abuse or illicit drug use.    ALLERGIES:  No Known Allergies    Home Medications:  aspirin 81 mg oral tablet: 1 tab(s) orally once a day (15 Nov 2022 15:21)  Calcium 400m tab(s) orally once a day (15 Nov 2022 15:21)  famotidine 40 mg oral tablet: 1 tab(s) orally once a day (15 Nov 2022 15:21)  furosemide 20 mg oral tablet: 1 tab(s) orally once a day (15 Nov 2022 15:21)  magnesium carbonate 250 mg oral capsule: 1 cap(s) orally once a day (15 Nov 2022 15:21)  Multiple Vitamins oral tablet: 1 tab(s) orally once a day (15 Nov 2022 15:21)  Potassium Chloride (Eqv-Klor-Con 10) 10 mEq oral tablet, extended release: 1 tab(s) orally once a day (15 Nov 2022 15:21)  Vitamin D3 25 mcg (1000 intl units) oral tablet: 1 tab(s) orally once a day (15 Nov 2022 15:21)    MEDICATIONS  (STANDING):  apixaban 5 milliGRAM(s) Oral two times a day  famotidine    Tablet 20 milliGRAM(s) Oral daily  metoprolol tartrate 25 milliGRAM(s) Oral two times a day    MEDICATIONS  (PRN):  acetaminophen     Tablet .. 650 milliGRAM(s) Oral every 6 hours PRN Temp greater or equal to 38C (100.4F), Mild Pain (1 - 3)  aluminum hydroxide/magnesium hydroxide/simethicone Suspension 30 milliLiter(s) Oral every 4 hours PRN Dyspepsia  bisacodyl 5 milliGRAM(s) Oral every 12 hours PRN Constipation  diltiazem Injectable 10 milliGRAM(s) IV Push every 4 hours PRN HR>130  melatonin 3 milliGRAM(s) Oral at bedtime PRN Insomnia  ondansetron Injectable 4 milliGRAM(s) IV Push every 6 hours PRN Nausea and/or Vomiting  senna 1 Tablet(s) Oral daily PRN Constipation    Vital Signs Last 24 Hrs  T(C): 36.8 (2022 07:56), Max: 36.9 (2022 05:30)  T(F): 98.2 (2022 07:56), Max: 98.5 (2022 05:30)  HR: 124 (2022 07:56) (119 - 124)  BP: 105/62 (2022 07:56) (85/53 - 122/86)  BP(mean): 98 (15 Nov 2022 14:57) (91 - 98)  RR: 18 (2022 07:56) (16 - 18)  SpO2: 99% (2022 07:56) (96% - 100%)    Parameters below as of 2022 07:56  Patient On (Oxygen Delivery Method): room air      I&O's Summary    15 Nov 2022 07:01  -  2022 07:00  --------------------------------------------------------  IN: 240 mL / OUT: 0 mL / NET: 240 mL      ________________________________  GENERAL APPEARANCE:  No acute distress  HEAD: normocephalic, atraumatic  NECK: supple, no jugular venous distention, no carotid bruit    HEART: Regular rate and rhythm, S1, S2 normal, 1/6 murmur    CHEST:  No anterior chest wall tenderness    LUNGS:  Clear to auscultation, without any wheezing, rhonchi or rales    ABDOMEN: soft, nontender, nondistended, with positive bowel sounds appreciated  EXTREMITIES: no edema.   NEURO: Alert and oriented x3  PSYC:  Normal affect  SKIN:  Dry  ________________________________   TELEMETRY:    ECG:    LABS:                        10.1   10.35 )-----------( 362      ( 2022 06:42 )             30.0             11-16    132<L>  |  97  |  13  ----------------------------<  113<H>  3.6   |  29  |  0.64    Ca    10.2<H>      2022 06:42  Mg     2.1     11-15    TPro  7.1  /  Alb  3.2<L>  /  TBili  0.6  /  DBili  x   /  AST  27  /  ALT  89<H>  /  AlkPhos  135<H>  11-16      LIVER FUNCTIONS - ( 2022 06:42 )  Alb: 3.2 g/dL / Pro: 7.1 gm/dL / ALK PHOS: 135 U/L / ALT: 89 U/L / AST: 27 U/L / GGT: x            ________________________________    RADIOLOGY & ADDITIONAL STUDIES:   INTERPRETATION:  AP erect chest on November 15, 2022 at 12:22 PM. Patient   has chest pain.    Heart size is within normal limits. Sternotomy is new since    this year.    Lungs remain clear.    Clips in the right breast area again noted.    IMPRESSION: Sternotomy new since February. No acute finding.    ________________________________    ASSESSMENT:  Paroxysmal atrial fibrillation/paroxysmal atrial flutter  History of left atrial myxoma status post surgery 2022  Elevated cardiac enzymes, likely due to recent cardiac surgery  Hyperlipidemia      PLAN:  Merary is a 71-year-old female who was status post atrial myxoma excision, admitted for atrial flutter/atrial fibrillation with RVR.  She underwent a MOHINI guided cardioversion earlier today, and initially converted to sinus rhythm however this was not maintained.  Recommend continue anticoagulation.  Start antiarrhythmic therapy with amiodarone.  Decrease metoprolol dose.  EP evaluation.    Regarding left atrial septal findings, likely due to postoperative changes and less likely represent thrombus.  The either way she will be anticoagulated.  Repeat MOHINI in 3 months.  The left ventricular outflow tract echogenic mass is likely a redundant, partial membrane which was seen on her prior transthoracic echo and does not appear increased in size.  Unlikely to represent vegetation given location.  No evidence of LVOT obstruction as a result of the mass.      ____________________________________________  (Dragon Dictation software used). Thank you for allowing me to participate in the care of your patient. Please contact me should any questions arise.    ELLIE Kelly DO, [facility]  Office: [phone]

## 2022-11-16 NOTE — PROCEDURAL SAFETY CHECKLIST WITH OR WITHOUT SEDATION - NSPOSTCOMMENTFT_GEN_ALL_CORE
probe in:  bubbles:  probe out: probe in: 09:43 bubbles: 09:55  probe out: 09:57 Attempted cardioversion 150J x1 with no success, 200J x1 with no success repeated 200Jx 1 with no success. CV tracing showing Afib 100. EKG performed at this time

## 2022-11-16 NOTE — DISCHARGE NOTE NURSING/CASE MANAGEMENT/SOCIAL WORK - PATIENT PORTAL LINK FT
You can access the FollowMyHealth Patient Portal offered by [facility] by registering at the following website: [email or web address]. By joining [facility]’s FollowMyHealth portal, you will also be able to view your health information using other applications (apps) compatible with our system.

## 2022-11-16 NOTE — DISCHARGE NOTE PROVIDER - NSDCCAREPROVSEEN_GEN_ALL_CORE_FT
Elzbieta Menjivar (Cardiology)  Katlin Damico (Hospital Medicine)  Carmelina Bolton (Cardiac Electrophysiology)  Mallory Doan (Cardiology)  Sadi Kelly (Cardiology)  Rani Asencio (Cardiac Electrophysiology)  Jaxon Burnett ([facility])

## 2022-11-16 NOTE — PHARMACOTHERAPY INTERVENTION NOTE - COMMENTS
New medications reviewed w/ patient, discussed indication/dose/side effects.   All questions answered

## 2022-11-16 NOTE — CONSULT NOTE ADULT - REASON FOR ADMISSION
Aflutter s/p recent heart surgery, Elevated Troponins
Aflutter s/p recent heart surgery, Elevated Troponin

## 2022-11-17 LAB
24R-OH-CALCIDIOL SERPL-MCNC: 47.7 NG/ML — SIGNIFICANT CHANGE UP (ref 30–80)
ADD ON TEST-SPECIMEN IN LAB: SIGNIFICANT CHANGE UP
ANION GAP SERPL CALC-SCNC: 4 MMOL/L — LOW (ref 5–17)
ANION GAP SERPL CALC-SCNC: 5 MMOL/L — SIGNIFICANT CHANGE UP (ref 5–17)
BUN SERPL-MCNC: 12 MG/DL — SIGNIFICANT CHANGE UP (ref 7–23)
BUN SERPL-MCNC: 13 MG/DL — SIGNIFICANT CHANGE UP (ref 7–23)
CA-I BLD-SCNC: 1.25 MMOL/L — SIGNIFICANT CHANGE UP (ref 1.15–1.33)
CALCIUM SERPL-MCNC: 9.3 MG/DL — SIGNIFICANT CHANGE UP (ref 8.5–10.1)
CALCIUM SERPL-MCNC: 9.4 MG/DL — SIGNIFICANT CHANGE UP (ref 8.5–10.1)
CALCIUM SERPL-MCNC: 9.7 MG/DL — SIGNIFICANT CHANGE UP (ref 8.4–10.5)
CHLORIDE SERPL-SCNC: 96 MMOL/L — SIGNIFICANT CHANGE UP (ref 96–108)
CHLORIDE SERPL-SCNC: 97 MMOL/L — SIGNIFICANT CHANGE UP (ref 96–108)
CO2 SERPL-SCNC: 28 MMOL/L — SIGNIFICANT CHANGE UP (ref 22–31)
CO2 SERPL-SCNC: 29 MMOL/L — SIGNIFICANT CHANGE UP (ref 22–31)
CREAT SERPL-MCNC: 0.6 MG/DL — SIGNIFICANT CHANGE UP (ref 0.5–1.3)
CREAT SERPL-MCNC: 0.65 MG/DL — SIGNIFICANT CHANGE UP (ref 0.5–1.3)
EGFR: 95 ML/MIN/1.73M2 — SIGNIFICANT CHANGE UP
EGFR: 97 ML/MIN/1.73M2 — SIGNIFICANT CHANGE UP
GLUCOSE SERPL-MCNC: 112 MG/DL — HIGH (ref 70–99)
GLUCOSE SERPL-MCNC: 119 MG/DL — HIGH (ref 70–99)
MAGNESIUM SERPL-MCNC: 2.2 MG/DL — SIGNIFICANT CHANGE UP (ref 1.6–2.6)
PHOSPHATE SERPL-MCNC: 3.2 MG/DL — SIGNIFICANT CHANGE UP (ref 2.5–4.5)
POTASSIUM SERPL-MCNC: 3.6 MMOL/L — SIGNIFICANT CHANGE UP (ref 3.5–5.3)
POTASSIUM SERPL-MCNC: 4.1 MMOL/L — SIGNIFICANT CHANGE UP (ref 3.5–5.3)
POTASSIUM SERPL-SCNC: 3.6 MMOL/L — SIGNIFICANT CHANGE UP (ref 3.5–5.3)
POTASSIUM SERPL-SCNC: 4.1 MMOL/L — SIGNIFICANT CHANGE UP (ref 3.5–5.3)
PTH-INTACT FLD-MCNC: 43 PG/ML — SIGNIFICANT CHANGE UP (ref 15–65)
SODIUM SERPL-SCNC: 128 MMOL/L — LOW (ref 135–145)
SODIUM SERPL-SCNC: 131 MMOL/L — LOW (ref 135–145)

## 2022-11-17 RX ORDER — POTASSIUM CHLORIDE 20 MEQ
20 PACKET (EA) ORAL
Refills: 0 | Status: COMPLETED | OUTPATIENT
Start: 2022-11-17 | End: 2022-11-17

## 2022-11-17 RX ORDER — AMIODARONE HYDROCHLORIDE 400 MG/1
400 TABLET ORAL EVERY 8 HOURS
Refills: 0 | Status: DISCONTINUED | OUTPATIENT
Start: 2022-11-17 | End: 2022-11-19

## 2022-11-17 RX ORDER — SODIUM CHLORIDE 9 MG/ML
1000 INJECTION INTRAMUSCULAR; INTRAVENOUS; SUBCUTANEOUS ONCE
Refills: 0 | Status: DISCONTINUED | OUTPATIENT
Start: 2022-11-17 | End: 2022-11-17

## 2022-11-17 RX ORDER — DIGOXIN 250 MCG
250 TABLET ORAL ONCE
Refills: 0 | Status: COMPLETED | OUTPATIENT
Start: 2022-11-17 | End: 2022-11-17

## 2022-11-17 RX ORDER — AMIODARONE HYDROCHLORIDE 400 MG/1
TABLET ORAL
Refills: 0 | Status: DISCONTINUED | OUTPATIENT
Start: 2022-11-17 | End: 2022-11-19

## 2022-11-17 RX ORDER — AMIODARONE HYDROCHLORIDE 400 MG/1
200 TABLET ORAL DAILY
Refills: 0 | Status: DISCONTINUED | OUTPATIENT
Start: 2022-11-20 | End: 2022-11-19

## 2022-11-17 RX ADMIN — AMIODARONE HYDROCHLORIDE 400 MILLIGRAM(S): 400 TABLET ORAL at 14:01

## 2022-11-17 RX ADMIN — Medication 1 TABLET(S): at 12:01

## 2022-11-17 RX ADMIN — Medication 250 MICROGRAM(S): at 11:58

## 2022-11-17 RX ADMIN — SENNA PLUS 2 TABLET(S): 8.6 TABLET ORAL at 21:58

## 2022-11-17 RX ADMIN — FAMOTIDINE 20 MILLIGRAM(S): 10 INJECTION INTRAVENOUS at 11:57

## 2022-11-17 RX ADMIN — POLYETHYLENE GLYCOL 3350 17 GRAM(S): 17 POWDER, FOR SOLUTION ORAL at 11:59

## 2022-11-17 RX ADMIN — Medication 250 MICROGRAM(S): at 10:00

## 2022-11-17 RX ADMIN — Medication 1000 UNIT(S): at 11:57

## 2022-11-17 RX ADMIN — MAGNESIUM OXIDE 400 MG ORAL TABLET 200 MILLIGRAM(S): 241.3 TABLET ORAL at 11:57

## 2022-11-17 RX ADMIN — AMIODARONE HYDROCHLORIDE 200 MILLIGRAM(S): 400 TABLET ORAL at 06:03

## 2022-11-17 RX ADMIN — Medication 12.5 MILLIGRAM(S): at 21:57

## 2022-11-17 RX ADMIN — APIXABAN 5 MILLIGRAM(S): 2.5 TABLET, FILM COATED ORAL at 21:57

## 2022-11-17 RX ADMIN — APIXABAN 5 MILLIGRAM(S): 2.5 TABLET, FILM COATED ORAL at 11:58

## 2022-11-17 RX ADMIN — AMIODARONE HYDROCHLORIDE 400 MILLIGRAM(S): 400 TABLET ORAL at 21:57

## 2022-11-17 RX ADMIN — Medication 20 MILLIEQUIVALENT(S): at 12:27

## 2022-11-17 RX ADMIN — Medication 20 MILLIEQUIVALENT(S): at 11:59

## 2022-11-17 RX ADMIN — Medication 12.5 MILLIGRAM(S): at 11:56

## 2022-11-17 NOTE — PROGRESS NOTE ADULT - SUBJECTIVE AND OBJECTIVE BOX
70 y/o female whose telemetry monitor reveals  AT Flutter and having pauses.   11/8 she had surgery at [place] and is s/o open heart surgery for atrial myxoma.  She was discharged 11/14 and took her heart rate from her watch it read 114-124 BPM. She went for urgent visit at Dr. Braun's office who gave her Metoprolol and has been on Eliquis.  She  presented  to  with complaints of SOB on 11/15.  She was found top be in AT Fl and had a MOHINI/CV.  MOHINI showed echogenic mass which was unlikely to be a vegetation.  She   Troponin Level 654    Presently is in At Flutter and is having frequent pauses up to 5 seconds.  She denies fever, chills, chest pain, SOB, dizziness, palpitations, weakness, nausea, vomiting, diarrhea, constipation, dysuria.      PMH of MGUS, osteoporosis and atrial myxoma s/p open heart surgery on 11/8/2022 BID.      Pt is resting in the bed, NAD. No event overnight.  Tele:  ROS:  General: Pt denies recent weight loss/fever/chills    Neurological: denies numbness or  sensation loss    Cardiovascular: denies chest pain/palpitations/leg edema    Respiratory and Thorax: denies SOB/cough/wheezing    Gastrointestinal: denies abdominal pain/diarrhea/constipation/bloody stool    Genitourinary: denies urinary frequency/urgency/ dysuria    Musculoskeletal: denies joint pain or swelling, denies restricted motion    Hematologic: denies abnormal bleeding  	    	  	    		        	    	          Physical Exam:  Vital Signs Last 24 Hrs  T(C): 36.7 (17 Nov 2022 08:04), Max: 36.9 (16 Nov 2022 19:50)  T(F): 98 (17 Nov 2022 08:04), Max: 98.5 (16 Nov 2022 19:50)  HR: 120 (17 Nov 2022 12:37) (120 - 124)  BP: 100/54 (17 Nov 2022 12:37) (95/66 - 100/54)  BP(mean): --  RR: 18 (17 Nov 2022 08:04) (18 - 18)  SpO2: 100% (17 Nov 2022 08:04) (100% - 100%)    Parameters below as of 17 Nov 2022 08:04  Patient On (Oxygen Delivery Method): room air      Vital Signs : BP        HR       RR                 Constitutional: well developed, well nourished, no deformities and no acute distress    Neurological: Alert & Oriented x 3, ALCOCER, no focal deficits    HEENT: NC/AT, PERRLA, EOMI,  Neck supple.    Respiratory: CTA B/L, No wheezing/crackles/rhonchi    Cardiovascular: (+) S1 & S2, RRR, No m/r/g    Gastrointestinal: soft, NT, nondistended, (+) BS    Genitourinary: non distended bladder, voiding freely    Extremities: No pedal edema, No clubbing, No cyanosis    Skin:  normal skin color and pigmentation, no skin lesions            Allergies    No Known Allergies    Intolerances      MEDICATIONS  (STANDING):  aMIOdarone    Tablet   Oral   aMIOdarone    Tablet 400 milliGRAM(s) Oral every 8 hours  apixaban 5 milliGRAM(s) Oral two times a day  cholecalciferol 1000 Unit(s) Oral daily  digoxin     Tablet 250 MICROGram(s) Oral once  famotidine    Tablet 20 milliGRAM(s) Oral daily  magnesium oxide 200 milliGRAM(s) Oral daily  metoprolol tartrate 12.5 milliGRAM(s) Oral two times a day  multivitamin 1 Tablet(s) Oral daily  polyethylene glycol 3350 17 Gram(s) Oral daily  senna 2 Tablet(s) Oral at bedtime    MEDICATIONS  (PRN):  acetaminophen     Tablet .. 650 milliGRAM(s) Oral every 6 hours PRN Temp greater or equal to 38C (100.4F), Mild Pain (1 - 3)  aluminum hydroxide/magnesium hydroxide/simethicone Suspension 30 milliLiter(s) Oral every 4 hours PRN Dyspepsia  bisacodyl 5 milliGRAM(s) Oral every 12 hours PRN Constipation  diltiazem Injectable 10 milliGRAM(s) IV Push every 4 hours PRN HR>130  melatonin 3 milliGRAM(s) Oral at bedtime PRN Insomnia  ondansetron Injectable 4 milliGRAM(s) IV Push every 6 hours PRN Nausea and/or Vomiting    LABS:                        10.1   10.35 )-----------( 362      ( 16 Nov 2022 06:42 )             30.0     11-17    131<L>  |  97  |  13  ----------------------------<  112<H>  3.6   |  29  |  0.60    Ca    9.3      17 Nov 2022 07:04  Phos  3.2     11-17  Mg     2.2     11-17    TPro  7.1  /  Alb  3.2<L>  /  TBili  0.6  /  DBili  x   /  AST  27  /  ALT  89<H>  /  AlkPhos  135<H>  11-16          EKG:  CXR:               HPI: 70 y/o female with PMHx of  MGUS,  atrial myxoma s/p resection of myxoma on 11/8/2022   Pt was discharged 11/14 and took her heart rate from her watch it read 114-124 BPM. She went for urgent visit at Dr. Braun's office who gave her Metoprolol and Eliquis.  She  presented  to  with complaints of SOB on 11/15.  She was found to be in Atrial flutter with upto 5.6 sec conversion pause to junctional rhythm, during sleep on tele. Pt underwent  MOHINI/CV on 11/16 -MOHINI showed echogenic mass which was unlikely to be a vegetation, possibly representing a partial redundant   membrane. After cardioversion pt had few beats of SR but remained in Aflutter.  PO amiodarone started on 11/16.    Pt is OOB, denies chest pain/SOB/palpitations at rest, but c/o 'feeling fluttering sensation in the chest' with activity.  Tele: AFlutter 120-130's bpm, no further pauses overnight    ROS: All other ROS is negative unless indicated above.      Physical Exam:  Vital Signs Last 24 Hrs  T(C): 36.7 (17 Nov 2022 08:04), Max: 36.9 (16 Nov 2022 19:50)  T(F): 98 (17 Nov 2022 08:04), Max: 98.5 (16 Nov 2022 19:50)  HR: 120 (17 Nov 2022 12:37) (120 - 124)  BP: 100/54 (17 Nov 2022 12:37) (95/66 - 100/54)  RR: 18 (17 Nov 2022 08:04) (18 - 18)  SpO2: 100% (17 Nov 2022 08:04) (100% - 100%)    Parameters below as of 17 Nov 2022 08:04  Patient On (Oxygen Delivery Method): room air      Constitutional: well developed,  no deformities and no acute distress    Neurological: Alert & Oriented x 3, ALCOCER, no focal deficits    HEENT: NC/AT, PERRLA, EOMI,  Neck supple.    Respiratory: CTA B/L, No wheezing/crackles/rhonchi    Cardiovascular: (+) irregular  S1 & S2, RRR,     Gastrointestinal: soft, NT, nondistended, (+) BS    Genitourinary: non distended bladder, voiding freely    Extremities: No pedal edema, No clubbing, No cyanosis    Skin:  mid sternal incision : C/D/I      Allergies    No Known Allergies    Intolerances      MEDICATIONS  (STANDING):  aMIOdarone    Tablet   Oral   aMIOdarone    Tablet 400 milliGRAM(s) Oral every 8 hours  apixaban 5 milliGRAM(s) Oral two times a day  cholecalciferol 1000 Unit(s) Oral daily  digoxin     Tablet 250 MICROGram(s) Oral once  famotidine    Tablet 20 milliGRAM(s) Oral daily  magnesium oxide 200 milliGRAM(s) Oral daily  metoprolol tartrate 12.5 milliGRAM(s) Oral two times a day  multivitamin 1 Tablet(s) Oral daily  polyethylene glycol 3350 17 Gram(s) Oral daily  senna 2 Tablet(s) Oral at bedtime    MEDICATIONS  (PRN):  acetaminophen     Tablet .. 650 milliGRAM(s) Oral every 6 hours PRN Temp greater or equal to 38C (100.4F), Mild Pain (1 - 3)  aluminum hydroxide/magnesium hydroxide/simethicone Suspension 30 milliLiter(s) Oral every 4 hours PRN Dyspepsia  bisacodyl 5 milliGRAM(s) Oral every 12 hours PRN Constipation  diltiazem Injectable 10 milliGRAM(s) IV Push every 4 hours PRN HR>130  melatonin 3 milliGRAM(s) Oral at bedtime PRN Insomnia  ondansetron Injectable 4 milliGRAM(s) IV Push every 6 hours PRN Nausea and/or Vomiting    LABS:                        10.1   10.35 )-----------( 362      ( 16 Nov 2022 06:42 )             30.0     11-17    131<L>  |  97  |  13  ----------------------------<  112<H>  3.6   |  29  |  0.60    Ca    9.3      17 Nov 2022 07:04  Phos  3.2     11-17  Mg     2.2     11-17    TPro  7.1  /  Alb  3.2<L>  /  TBili  0.6  /  DBili  x   /  AST  27  /  ALT  89<H>  /  AlkPhos  135<H>  11-16

## 2022-11-17 NOTE — PROGRESS NOTE ADULT - ASSESSMENT
72 y/o female who under went removal of removal of atrial myxoma on 11/8/22. Saw Dr. Braun for urgent visit who started her on Metopolol 25 mgs bid and Eliquis 2.5 mgs and  she was found in AT Fl.  Had MOHINI and CV on 11/15 Sinus Rhythm was restored- now in AT Flutter with RVR and pauses up to 5 seconds followed by accelerated junctional rhythm .  Last dose of Metoprolol last night, last pause at 5:04 am on 11/16.  ECHO show LVEF of 60% with echogenic mass in the left ventricular outflow tract, attached to   the ventricular septum, possibly representing a partial redundant   membrane      Plan was  discussed with Dr. Coello  Continue with Eliquis 5mgs bid   Continue to monitor this pt  Agree with discontinuing Metoprolol  Continue with Amiodarone   Information sheet on Amiodarone and potential adverse effects were discussed and patients questions answered    MCOT on dischage     70 yo F with above PMHx, recent post-op presented with AFlutter 120-130's bpm  s/p failed DCCV on 11/16/22.  -Continue  Eliquis 5mg bid   -Amiodarone po loading started on 11/16  Information sheet on Amiodarone and potential adverse effects were discussed and patients questions answered    - avoid concomitant use of digoxin while on  amiodarone  - Agree with lower lose of metoprolol 12.5mg PO BID while on telemetry  - May consider repeat DCCV once loaded with amiodarone (for at least 4-5 days)  - maintain K+>4.0, Mg++>2.0  -consider MCOT on discharge  Plan d/w pt//   70 yo F with above PMHx, recent post-op presented with AFlutter 120-130's bpm  s/p failed DCCV on 11/16/22.  -Continue  Eliquis 5mg bid   -Amiodarone po loading started on 11/16  Information sheet on Amiodarone and potential adverse effects were discussed and patients questions answered    - avoid concomitant use of digoxin while on  amiodarone  - Agree with lower lose of metoprolol 12.5mg PO BID while on telemetry  - May consider repeat DCCV once loaded with amiodarone (for at least 4-5 days)  - maintain K+>4.0, Mg++>2.0  -consider MCOT on discharge  Plan d/w pt/

## 2022-11-17 NOTE — PROGRESS NOTE ADULT - SUBJECTIVE AND OBJECTIVE BOX
Chief Complaint: Acute onset of profound fatigue    Interval Hx: Patient underwent MOHINI DCCV yesterday AM without successful conversion to NSR. She was then started on amiodarone. Remains in atrial flutter. Amiodarone increased today. Patient seen and examined earlier today. OOB in chair. Resting comfortably. She denied fever, chills, chest pain, SOB, dizziness, palpitations, weakness, nausea, vomiting, diarrhea, constipation, dysuria. She was eager to get up and ambulate so she ambulated down the hallway to the nursing station, around that area and back to her room. Tolerated that well without symptoms. HR did increase to 120 during that walk.     ROS: Multi system review is comprehensively negative x 10 systems except as above    Vitals:  T(F): 98 (17 Nov 2022 08:04), Max: 98.5 (16 Nov 2022 19:50)  HR: 120 (17 Nov 2022 12:37) (120 - 124)  BP: 100/54 (17 Nov 2022 12:37) (95/66 - 100/54)  RR: 18 (17 Nov 2022 08:04) (18 - 18)  SpO2: 100% (17 Nov 2022 08:04) (100% - 100%) on room air    Exam:  Gen: No acute distress  HEENT: NCAT PERRL EOMI MMM clear oropharynx  Neck: Supple, no JVD, no LAD  CVS: s1 s2 normal, tachycardic HR 120s,regular  Chest: Median sternotomy scar, normal resp effort, lungs CTA B/L  Abd: +BS, soft NT ND   Ext: No edema or calf tenderness  Skin: Warm, dry  Neuro: A+Ox3, no gross focal deficits  Mood: Calm, pleasant    Labs:                        10.1   10.35 )--------( 362             30.0     MCV WNL   Iron 45   Iron sat 13%   TIBC 335   Ferritin 210      128  |  96  |  12  ---------------------<  119  4.1   |  28  |  0.65    Ca 9.4   Phos 3.2   Mg 2.2    TPro  7.1  /  Alb  3.2  /  TBili  0.6  /  DBili  x   /  AST  27  /  ALT  89  /  AlkPhos  135    Troponin 681, 617, 549    TSH 3.28   FT4 1.38    Micro:  COVID19 PCR 11/15: negative  Flu PCR 11/15: negative  Influenza PCR 11/15: negative      Imaging:  CXR 11/15: Heart size is within normal limits. Sternotomy is new since February 21 this year. Lungs remain clear. Clips in the right breast area again noted.    Cardiac Testing:  [facility] 11/17: 2:1 atrial flutter    MOHINI/DCCV 11/16: The left atrium appears normal. No thrombus seen in the left atrial or left atrial appendage. Normal GLO velocity. There is a small, echogenic, fixed mass noted on the left atrial side, likely postop changes from recent myxoma excision and less likely thrombus. A pericardial effusion is not present. Estimated left ventricular ejection fraction is 60 %. There is a small, mobile, echogenic mass in the left ventricular outflow tract, attached to the ventricular septum, possibly representing a partial redundant membrane. No turbulence noted. No aortic regurgitation noted. The patient underwent synchronized cardioversion at 150 J x 1, and 200 J x 2. Sinus rhythm was restored and with each attempt, however not maintain.    EKG 11/15: Rapid atrial flutter    Meds:  MEDICATIONS  (STANDING):  aMIOdarone    Tablet   Oral   aMIOdarone    Tablet 400 milliGRAM(s) Oral every 8 hours  apixaban 5 milliGRAM(s) Oral two times a day  cholecalciferol 1000 Unit(s) Oral daily  famotidine    Tablet 20 milliGRAM(s) Oral daily  magnesium oxide 200 milliGRAM(s) Oral daily  metoprolol tartrate 12.5 milliGRAM(s) Oral two times a day  multivitamin 1 Tablet(s) Oral daily  polyethylene glycol 3350 17 Gram(s) Oral daily  senna 2 Tablet(s) Oral at bedtime    MEDICATIONS  (PRN):  acetaminophen     Tablet .. 650 milliGRAM(s) Oral every 6 hours PRN Temp greater or equal to 38C (100.4F), Mild Pain (1 - 3)  aluminum hydroxide/magnesium hydroxide/simethicone Suspension 30 milliLiter(s) Oral every 4 hours PRN Dyspepsia  bisacodyl 5 milliGRAM(s) Oral every 12 hours PRN Constipation  diltiazem Injectable 10 milliGRAM(s) IV Push every 4 hours PRN HR>130  melatonin 3 milliGRAM(s) Oral at bedtime PRN Insomnia  ondansetron Injectable 4 milliGRAM(s) IV Push every 6 hours PRN Nausea and/or Vomiting

## 2022-11-17 NOTE — PROGRESS NOTE ADULT - SUBJECTIVE AND OBJECTIVE BOX
The patient was seen and examined. No acute events overnight.  No chest pain.  Somewhat fatigue, with HR in the 120s.     Initial Consult HPI  ________________________________  PHaseeb GIBSON is a 71y year old Female with a past medical history of left atrial mass diagnosed earlier this year, subsequent MRI confirmed a mass which was determined to likely be a myxoma, for which she underwent surgery at [facility] on 2022, prior to which she had a cardiac CTA showing no coronary artery disease, history of MGUS, hyperlipidemia, GERD, and a dilated pulmonary artery.  She was seen in our office yesterday for fatigue.  She was noted to be in atrial flutter with rapid ventricular response, despite given oral amiodarone office patient sent to the ER for further evaluation.  On telemetry she has been in atrial fibrillation/atrial flutter with rapid ventricular response, with episodes of pauses and junctional escape beats.  Cardiac enzymes were mildly elevated but remained flat.  She denies any chest discomfort.  Her blood pressure is on the low side, which is close to her baseline.  She has pain at her sternotomy site.     ________________________________  Review of systems: A 10 point review of system has been performed, and is negative except for what has been mentioned in the above history of present illness.     PAST MEDICAL & SURGICAL HISTORY:  GERD (gastroesophageal reflux disease)      Osteoporosis      Atrial myxoma  s/p resection 2022      Thyroid nodule      History of open heart surgery  atrial myxoma removal 2022     FAMILY HISTORY:  Known health problems: none (Father, Mother)         SOCIAL HISTORY: The patient denies any tobacco abuse, alcohol abuse or illicit drug use.    ALLERGIES:  No Known Allergies    Home Medications:  aspirin 81 mg oral tablet: 1 tab(s) orally once a day (15 Nov 2022 15:21)  Calcium 400m tab(s) orally once a day (15 Nov 2022 15:21)  famotidine 40 mg oral tablet: 1 tab(s) orally once a day (15 Nov 2022 15:21)  furosemide 20 mg oral tablet: 1 tab(s) orally once a day (15 Nov 2022 15:21)  magnesium carbonate 250 mg oral capsule: 1 cap(s) orally once a day (15 Nov 2022 15:21)  Multiple Vitamins oral tablet: 1 tab(s) orally once a day (15 Nov 2022 15:21)  Potassium Chloride (Eqv-Klor-Con 10) 10 mEq oral tablet, extended release: 1 tab(s) orally once a day (15 Nov 2022 15:21)  Vitamin D3 25 mcg (1000 intl units) oral tablet: 1 tab(s) orally once a day (15 Nov 2022 15:21)      MEDICATIONS  (STANDING):  aMIOdarone    Tablet   Oral   aMIOdarone    Tablet 200 milliGRAM(s) Oral every 8 hours  apixaban 5 milliGRAM(s) Oral two times a day  cholecalciferol 1000 Unit(s) Oral daily  digoxin     Tablet 250 MICROGram(s) Oral daily  famotidine    Tablet 20 milliGRAM(s) Oral daily  magnesium oxide 200 milliGRAM(s) Oral daily  metoprolol tartrate 12.5 milliGRAM(s) Oral two times a day  multivitamin 1 Tablet(s) Oral daily  polyethylene glycol 3350 17 Gram(s) Oral daily  potassium chloride    Tablet ER 20 milliEquivalent(s) Oral every 2 hours  senna 2 Tablet(s) Oral at bedtime    MEDICATIONS  (PRN):  acetaminophen     Tablet .. 650 milliGRAM(s) Oral every 6 hours PRN Temp greater or equal to 38C (100.4F), Mild Pain (1 - 3)  aluminum hydroxide/magnesium hydroxide/simethicone Suspension 30 milliLiter(s) Oral every 4 hours PRN Dyspepsia  bisacodyl 5 milliGRAM(s) Oral every 12 hours PRN Constipation  diltiazem Injectable 10 milliGRAM(s) IV Push every 4 hours PRN HR>130  melatonin 3 milliGRAM(s) Oral at bedtime PRN Insomnia  ondansetron Injectable 4 milliGRAM(s) IV Push every 6 hours PRN Nausea and/or Vomiting      Vital Signs Last 24 Hrs  T(C): 36.7 (2022 08:04), Max: 36.9 (2022 19:50)  T(F): 98 (2022 08:04), Max: 98.5 (2022 19:50)  HR: 122 (2022 08:04) (118 - 124)  BP: 95/66 (2022 08:04) (95/66 - 102/72)  BP(mean): --  RR: 18 (2022 08:04) (16 - 18)  SpO2: 100% (2022 08:04) (98% - 100%)    Parameters below as of 2022 08:04  Patient On (Oxygen Delivery Method): room air      I&O's Summary    2022 07:01  -  2022 07:00  --------------------------------------------------------  IN: 720 mL / OUT: 1 mL / NET: 719 mL      ________________________________  GENERAL APPEARANCE:  No acute distress  HEAD: normocephalic, atraumatic  NECK: supple, no jugular venous distention, no carotid bruit    HEART: Regular rate and rhythm, S1, S2 normal, 1/6 murmur    CHEST:  No anterior chest wall tenderness    LUNGS:  Clear to auscultation, without any wheezing, rhonchi or rales    ABDOMEN: soft, nontender, nondistended, with positive bowel sounds appreciated  EXTREMITIES: no edema.   NEURO: Alert and oriented x3  PSYC:  Normal affect  SKIN:  Dry  ________________________________   TELEMETRY: atrial flutter at 124 BPM    ECG: atrial flutter with RVR    LABS:                         10.1   10.35 )-----------( 362      ( 2022 06:42 )             30.0          11-17    131<L>  |  97  |  13  ----------------------------<  112<H>  3.6   |  29  |  0.60    Ca    9.3      2022 07:04  Phos  3.2     11-17  Mg     2.2     11-17    TPro  7.1  /  Alb  3.2<L>  /  TBili  0.6  /  DBili  x   /  AST  27  /  ALT  89<H>  /  AlkPhos  135<H>          ________________________________    RADIOLOGY & ADDITIONAL STUDIES:   INTERPRETATION:  AP erect chest on November 15, 2022 at 12:22 PM. Patient   has chest pain.    Heart size is within normal limits. Sternotomy is new since    this year.    Lungs remain clear.    Clips in the right breast area again noted.    IMPRESSION: Sternotomy new since February. No acute finding.    MOHINI 2022   The left atrium appears normal.   No thrombus seen in the left atrial or left atrial appendage. Normal GLO   velocity.   There is a small, echogenic, fixed mass noted on the left atrial side,   likely postop changes from recent myxoma excision and less likely   thrombus.   A pericardial effusion is not present.   Estimated left ventricular ejection fraction is 60 %. There is a small,   mobile, echogenic mass in the left ventricular outflow tract, attached to   the ventricular septum, possibly representing a partial redundant   membrane. No turbulence noted. No aortic regurgitation noted.   The patient underwent synchronized cardioversion at 150 J x 1, and 200 J   x   2. Sinus rhythm was restored and with each attempt, however not maintain.   Recommend medical therapy with anticoagulation and antiarrhythmic   therapy.    ________________________________    ASSESSMENT:  Paroxysmal atrial fibrillation/paroxysmal atrial flutter  History of left atrial myxoma status post surgery 2022  Elevated cardiac enzymes, likely due to recent cardiac surgery  Hyperlipidemia      PLAN:  Merary is a 71-year-old female who was status post atrial myxoma excision, admitted for atrial flutter/atrial fibrillation with RVR.    Failed MOHINI DCCV 22  Cont amio but increase dose given no more recurrent pauses  One dose of dig  Cont anticoagulation  Cont low dose BB  Poss rpt DCCV tomm    ____________________________________________  (Dragon Dictation software used). Thank you for allowing me to participate in the care of your patient. Please contact me should any questions arise.    ELLIE Kelly DO, FACC  Office: [phone]

## 2022-11-17 NOTE — PROGRESS NOTE ADULT - ASSESSMENT
71 year-old woman with hx of MGUS, osteoporosis, left atrial myxoma s/p resection via double atriotomy and bovine pericardial patch repair at [facility] on 11/8, discharged from [facility] in good condition on 11/14, now presents from Dr. Braun's office for further management of rapid atrial flutter. She awoke from sleep on 11/15 with new sense of profound fatigue, had a HR of 124 on her watch, prompting her to see Dr. Kaye. At his office, she was noted to be in rapid atrial flutter, was given PO metoprolol and sent to [facility]. Here EKG AT flutter 107. She was given IV fluid, metoprolol tartrate, aspirin and Eliquis and admitted to Medicine.     Atrial flutter, atrial tachycardia  S/P unsuccessful DCCV 11/16. Started on amiodarone thereafter. Remains in rapid atrial flutter. Increased amiodarone as per Cardiology and Cardiac EP. On Eliquis.   - Continue amiodarone  - Continue Eliquis  - Daily EKG  - Daily lytes    Elevated troponin  No angina or CHF sx. Likely related to recent cardiac surgery though checked records at [facility] admission and she did not have troponin to compare. Alternatively, could be myocardial demand ischemia without infarction, in the setting of rapid atrial flutter.   - Continue to monitor    Normocytic anemia  Hgb ~10. Does not appear to have iron deficiency. Most likely is related to recent open heart surgery.   - Continue to monitor    Hyponatremia  Na 130 upon admission here, similar at [facility]. Asymptomatic from this. 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    - Serum and urine osm pending, urine sodium and creatinine pending, TSH WNL, doubt adrenal insufficiency but will send AM fasting cortisol with next labs  - Start fluid restriction to 1L  - Trend Na    Hypercalcemia  Resolved. Ca 10.2, corrected for albumin would be a bit higher. Etiology unclear. Appears to be asymptomatic from this finding. Repeat Ca WNL now.   - Continue to monitor    GERD  Stable   - Continue home medication famotidine 20 mg daily      Diet: Regular  DVT px: On Eliquis for aflutter  Code status: Full Code  Dispo: Home when clinically improved and inpatient workup is complete

## 2022-11-18 LAB
ANION GAP SERPL CALC-SCNC: 5 MMOL/L — SIGNIFICANT CHANGE UP (ref 5–17)
BUN SERPL-MCNC: 12 MG/DL — SIGNIFICANT CHANGE UP (ref 7–23)
CALCIUM SERPL-MCNC: 9.7 MG/DL — SIGNIFICANT CHANGE UP (ref 8.5–10.1)
CHLORIDE SERPL-SCNC: 99 MMOL/L — SIGNIFICANT CHANGE UP (ref 96–108)
CO2 SERPL-SCNC: 29 MMOL/L — SIGNIFICANT CHANGE UP (ref 22–31)
CREAT ?TM UR-MCNC: 30 MG/DL — SIGNIFICANT CHANGE UP
CREAT SERPL-MCNC: 0.67 MG/DL — SIGNIFICANT CHANGE UP (ref 0.5–1.3)
EGFR: 95 ML/MIN/1.73M2 — SIGNIFICANT CHANGE UP
GLUCOSE SERPL-MCNC: 109 MG/DL — HIGH (ref 70–99)
MAGNESIUM SERPL-MCNC: 2.4 MG/DL — SIGNIFICANT CHANGE UP (ref 1.6–2.6)
PHOSPHATE SERPL-MCNC: 3.2 MG/DL — SIGNIFICANT CHANGE UP (ref 2.5–4.5)
POTASSIUM SERPL-MCNC: 3.8 MMOL/L — SIGNIFICANT CHANGE UP (ref 3.5–5.3)
POTASSIUM SERPL-SCNC: 3.8 MMOL/L — SIGNIFICANT CHANGE UP (ref 3.5–5.3)
SODIUM SERPL-SCNC: 133 MMOL/L — LOW (ref 135–145)
SODIUM UR-SCNC: <20 MMOL/L — SIGNIFICANT CHANGE UP

## 2022-11-18 RX ADMIN — Medication 1 TABLET(S): at 10:55

## 2022-11-18 RX ADMIN — AMIODARONE HYDROCHLORIDE 400 MILLIGRAM(S): 400 TABLET ORAL at 13:59

## 2022-11-18 RX ADMIN — SENNA PLUS 2 TABLET(S): 8.6 TABLET ORAL at 21:09

## 2022-11-18 RX ADMIN — APIXABAN 5 MILLIGRAM(S): 2.5 TABLET, FILM COATED ORAL at 10:48

## 2022-11-18 RX ADMIN — Medication 12.5 MILLIGRAM(S): at 10:46

## 2022-11-18 RX ADMIN — AMIODARONE HYDROCHLORIDE 400 MILLIGRAM(S): 400 TABLET ORAL at 21:09

## 2022-11-18 RX ADMIN — POLYETHYLENE GLYCOL 3350 17 GRAM(S): 17 POWDER, FOR SOLUTION ORAL at 13:02

## 2022-11-18 RX ADMIN — Medication 12.5 MILLIGRAM(S): at 21:09

## 2022-11-18 RX ADMIN — MAGNESIUM OXIDE 400 MG ORAL TABLET 200 MILLIGRAM(S): 241.3 TABLET ORAL at 10:46

## 2022-11-18 RX ADMIN — Medication 1000 UNIT(S): at 10:45

## 2022-11-18 RX ADMIN — FAMOTIDINE 20 MILLIGRAM(S): 10 INJECTION INTRAVENOUS at 10:47

## 2022-11-18 RX ADMIN — Medication 3 MILLIGRAM(S): at 21:10

## 2022-11-18 RX ADMIN — AMIODARONE HYDROCHLORIDE 400 MILLIGRAM(S): 400 TABLET ORAL at 05:39

## 2022-11-18 RX ADMIN — APIXABAN 5 MILLIGRAM(S): 2.5 TABLET, FILM COATED ORAL at 21:09

## 2022-11-18 NOTE — PROGRESS NOTE ADULT - SUBJECTIVE AND OBJECTIVE BOX
Chief Complaint: Acute onset of profound fatigue    Interval Hx: Patient underwent MOHINI DCCV yesterday AM without successful conversion to NSR. She was then started on amiodarone. Remains in atrial flutter. Amiodarone increased today. Patient seen and examined earlier today. OOB in chair. Resting comfortably. She denied fever, chills, chest pain, SOB, dizziness, palpitations, weakness, nausea, vomiting, diarrhea, constipation, dysuria. She was eager to get up and ambulate so she ambulated down the hallway to the nursing station, around that area and back to her room. Tolerated that well without symptoms. HR did increase to 120 during that walk.     ROS: Multi system review is comprehensively negative x 10 systems except as above    Vitals:  T(F): 98 (17 Nov 2022 08:04), Max: 98.5 (16 Nov 2022 19:50)  HR: 120 (17 Nov 2022 12:37) (120 - 124)  BP: 100/54 (17 Nov 2022 12:37) (95/66 - 100/54)  RR: 18 (17 Nov 2022 08:04) (18 - 18)  SpO2: 100% (17 Nov 2022 08:04) (100% - 100%) on room air    Exam:  Gen: No acute distress  HEENT: NCAT PERRL EOMI MMM clear oropharynx  Neck: Supple, no JVD, no LAD  CVS: s1 s2 normal, tachycardic HR 120s,regular  Chest: Median sternotomy scar, normal resp effort, lungs CTA B/L  Abd: +BS, soft NT ND   Ext: No edema or calf tenderness  Skin: Warm, dry  Neuro: A+Ox3, no gross focal deficits  Mood: Calm, pleasant    Labs:                        10.1   10.35 )--------( 362             30.0     MCV WNL   Iron 45   Iron sat 13%   TIBC 335   Ferritin 210      128  |  96  |  12  ---------------------<  119  4.1   |  28  |  0.65    Ca 9.4   Phos 3.2   Mg 2.2    TPro  7.1  /  Alb  3.2  /  TBili  0.6  /  DBili  x   /  AST  27  /  ALT  89  /  AlkPhos  135    Troponin 681, 617, 549    TSH 3.28   FT4 1.38    Micro:  COVID19 PCR 11/15: negative  Flu PCR 11/15: negative  Influenza PCR 11/15: negative      Imaging:  CXR 11/15: Heart size is within normal limits. Sternotomy is new since February 21 this year. Lungs remain clear. Clips in the right breast area again noted.    Cardiac Testing:  [facility] 11/17: 2:1 atrial flutter    MOHINI/DCCV 11/16: The left atrium appears normal. No thrombus seen in the left atrial or left atrial appendage. Normal GLO velocity. There is a small, echogenic, fixed mass noted on the left atrial side, likely postop changes from recent myxoma excision and less likely thrombus. A pericardial effusion is not present. Estimated left ventricular ejection fraction is 60 %. There is a small, mobile, echogenic mass in the left ventricular outflow tract, attached to the ventricular septum, possibly representing a partial redundant membrane. No turbulence noted. No aortic regurgitation noted. The patient underwent synchronized cardioversion at 150 J x 1, and 200 J x 2. Sinus rhythm was restored and with each attempt, however not maintain.    EKG 11/15: Rapid atrial flutter    Meds:  MEDICATIONS  (STANDING):  aMIOdarone    Tablet   Oral   aMIOdarone    Tablet 400 milliGRAM(s) Oral every 8 hours  apixaban 5 milliGRAM(s) Oral two times a day  cholecalciferol 1000 Unit(s) Oral daily  famotidine    Tablet 20 milliGRAM(s) Oral daily  magnesium oxide 200 milliGRAM(s) Oral daily  metoprolol tartrate 12.5 milliGRAM(s) Oral two times a day  multivitamin 1 Tablet(s) Oral daily  polyethylene glycol 3350 17 Gram(s) Oral daily  senna 2 Tablet(s) Oral at bedtime    MEDICATIONS  (PRN):  acetaminophen     Tablet .. 650 milliGRAM(s) Oral every 6 hours PRN Temp greater or equal to 38C (100.4F), Mild Pain (1 - 3)  aluminum hydroxide/magnesium hydroxide/simethicone Suspension 30 milliLiter(s) Oral every 4 hours PRN Dyspepsia  bisacodyl 5 milliGRAM(s) Oral every 12 hours PRN Constipation  diltiazem Injectable 10 milliGRAM(s) IV Push every 4 hours PRN HR>130  melatonin 3 milliGRAM(s) Oral at bedtime PRN Insomnia  ondansetron Injectable 4 milliGRAM(s) IV Push every 6 hours PRN Nausea and/or Vomiting                                     Chief Complaint: Acute onset of profound fatigue    Interval Hx: Patient seen and examined earlier today. Patient has been asymptomatic the past 24hrs. No chest pain, dyspnea, dizziness, lightheadedness or palpitations. No abdominal complaints or urinary complaints. No weakness. No fevers or chills. Unfortunately she remains in rapid atrial flutter, resting HRs 100s, increases to 120s with light activity. She was seen by Cardiology and Cardiac Electrophysiology who both advised she remain hospitalized for continued amiodarone loading. She may even need repeat DCCV.     ROS: Multi system review is comprehensively negative x 10 systems except as above    Vitals:  T(F): 98.3 (18 Nov 2022 07:54), Max: 98.3 (18 Nov 2022 07:54)  HR: 114 (18 Nov 2022 07:54) (98 - 117)  BP: 104/62 (18 Nov 2022 07:54) (104/62 - 114/71)  RR: 18 (18 Nov 2022 07:54) (18 - 18)  SpO2: 100% (18 Nov 2022 07:54) (100% - 100%) on room air    Exam:  Gen: No acute distress  HEENT: NCAT PERRL EOMI MMM clear oropharynx  Neck: Supple, no JVD, no LAD  CVS: s1 s2 normal, tachycardic HR 120s,regular  Chest: Median sternotomy scar, normal resp effort, lungs CTA B/L  Abd: +BS, soft NT ND   Ext: No edema or calf tenderness  Skin: Warm, dry  Neuro: A+Ox3, no gross focal deficits  Mood: Calm, pleasant    Labs:                        10.1   10.35 )--------( 362             30.0     MCV WNL   Iron 45   Iron sat 13%   TIBC 335   Ferritin 210      133  |  99  |  12  --------------------< 109  3.8   |  29  |  0.67    Ca 9.7   Phos 3.2   Mg 2.4    TPro  7.1  /  Alb  3.2  /  TBili  0.6  /  DBili  x   /  AST  27  /  ALT  89  /  AlkPhos  135    Troponin 681, 617, 549    TSH 3.28   FT4 1.38    Micro:  COVID19 PCR 11/15: negative  Flu PCR 11/15: negative  Influenza PCR 11/15: negative      Imaging:  CXR 11/15: Heart size is within normal limits. Sternotomy is new since February 21 this year. Lungs remain clear. Clips in the right breast area again noted.    Cardiac Testing:  Tele 11/18: Continued atrial flutter, HRs 100s at rest, up to 120s-130s with light activity, no pauses    Tele 11/17: 2:1 atrial flutter    MOHINI/DCCV 11/16: The left atrium appears normal. No thrombus seen in the left atrial or left atrial appendage. Normal GLO velocity. There is a small, echogenic, fixed mass noted on the left atrial side, likely postop changes from recent myxoma excision and less likely thrombus. A pericardial effusion is not present. Estimated left ventricular ejection fraction is 60 %. There is a small, mobile, echogenic mass in the left ventricular outflow tract, attached to the ventricular septum, possibly representing a partial redundant membrane. No turbulence noted. No aortic regurgitation noted. The patient underwent synchronized cardioversion at 150 J x 1, and 200 J x 2. Sinus rhythm was restored and with each attempt, however not maintain.    EKG 11/15: Rapid atrial flutter    Meds:  MEDICATIONS  (STANDING):  aMIOdarone    Tablet 400 milliGRAM(s) Oral every 8 hours  apixaban 5 milliGRAM(s) Oral two times a day  cholecalciferol 1000 Unit(s) Oral daily  famotidine    Tablet 20 milliGRAM(s) Oral daily  magnesium oxide 200 milliGRAM(s) Oral daily  metoprolol tartrate 12.5 milliGRAM(s) Oral two times a day  multivitamin 1 Tablet(s) Oral daily  polyethylene glycol 3350 17 Gram(s) Oral daily  senna 2 Tablet(s) Oral at bedtime    MEDICATIONS  (PRN):  acetaminophen     Tablet .. 650 milliGRAM(s) Oral every 6 hours PRN Temp greater or equal to 38C (100.4F), Mild Pain (1 - 3)  aluminum hydroxide/magnesium hydroxide/simethicone Suspension 30 milliLiter(s) Oral every 4 hours PRN Dyspepsia  bisacodyl 5 milliGRAM(s) Oral every 12 hours PRN Constipation  diltiazem Injectable 10 milliGRAM(s) IV Push every 4 hours PRN HR>130  melatonin 3 milliGRAM(s) Oral at bedtime PRN Insomnia  ondansetron Injectable 4 milliGRAM(s) IV Push every 6 hours PRN Nausea and/or Vomiting

## 2022-11-18 NOTE — PROGRESS NOTE ADULT - ASSESSMENT
72 yo F with above PMHx, recent post-op of mixoma resection, presented with AFlutter 120-130's bpm  s/p failed DCCV on 11/16/22.  -Continue  Eliquis 5mg bid   -Amiodarone po loading started on 11/16  Information sheet on Amiodarone and potential adverse effects were discussed and patients questions answered    - avoid concomitant use of digoxin while on  amiodarone  - Agree with lower lose of metoprolol 12.5mg PO BID while on telemetry  - May consider repeat DCCV once loaded with amiodarone (for at least 4-5 days)  - maintain K+>4.0, Mg++>2.0  -consider MCOT on discharge  Plan d/w pt//cardiologist/hospitalist

## 2022-11-18 NOTE — PROGRESS NOTE ADULT - ASSESSMENT
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 Braun's office for further management of rapid atrial flutter. She awoke from sleep on 11/15 with new sense of profound fatigue, had a HR of 124 on her watch, prompting her to see Dr. Kaye. At his office, she was noted to be in rapid atrial flutter, was given PO metoprolol and sent to [facility]. Here EKG AT flutter 107. She was given IV fluid, metoprolol tartrate, aspirin and Eliquis and admitted to Medicine.     Atrial flutter, atrial tachycardia  S/P unsuccessful DCCV 11/16. Started on amiodarone thereafter. Remains in rapid atrial flutter. Increased amiodarone as per Cardiology and Cardiac EP. On Eliquis.   - Continue amiodarone  - Continue Eliquis  - Daily EKG  - Daily lytes    Elevated troponin  No angina or CHF sx. Likely related to recent cardiac surgery though checked records at [facility] admission and she did not have troponin to compare. Alternatively, could be myocardial demand ischemia without infarction, in the setting of rapid atrial flutter.   - Continue to monitor    Normocytic anemia  Hgb ~10. Does not appear to have iron deficiency. Most likely is related to recent open heart surgery.   - Continue to monitor    Hyponatremia  Na 130 upon admission here, similar at [facility]. Asymptomatic from this. 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    - Serum and urine osm pending, urine sodium and creatinine pending, TSH WNL, doubt adrenal insufficiency but will send AM fasting cortisol with next labs  - Start fluid restriction to 1L  - Trend Na    Hypercalcemia  Resolved. Ca 10.2, corrected for albumin would be a bit higher. Etiology unclear. Appears to be asymptomatic from this finding. Repeat Ca WNL now.   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 Braun's office after she awoke on 11/15 with profound fatigue and found her HR to be 120s on her watch. At Dr. Braun's office she was noted to be in rapid atrial flutter, given PO metoprolol, and sent to [facility]. Here, EKG w/ AT/aflutter 107. She was given IV fluid, metoprolol tartrate, aspirin and Eliquis and admitted to Medicine.     Atrial flutter, atrial tachycardia  S/P unsuccessful DCCV 11/16. Started on amiodarone thereafter. Remains in rapid atrial flutter. Increased amiodarone 11/17 as per Cardiology and Cardiac EP. On Eliquis.   - Continue amiodarone load  - Continue Eliquis  - Daily EKG  - Daily lytes  - May need repeat DCCV    Elevated troponin  No angina or CHF sx. Likely related to recent cardiac surgery though checked records at [facility] admission and she did not have troponin to compare. Alternatively, could be myocardial demand ischemia without infarction, in the setting of rapid atrial flutter. Unable to determine.   - Continue to monitor    Normocytic anemia  Hgb ~10. Does not appear to have iron deficiency. Most likely is related to recent open heart surgery.   - Continue to monitor    Hypotonic hyponatremia  Na 130 upon admission here, similar at [facility]. Asymptomatic from this. Yesterday, Na decreased 128 after a normal saline IV fluid challenge, suggesting SIADH. She was then started on fluid restriction and Na has since improved, now 132. TSH WNL.     - Continue fluid restriction to 1L  - Continue to trend Na and if Na doesnt improve, will repeat urine Na, urine Osm, check fasting AM cortisol    Hypercalcemia  Resolved. Ca 10.2, corrected for albumin would be a bit higher. Etiology unclear. Appears to be asymptomatic from this finding. Repeat Ca WNL now.   - Continue to monitor    GERD  Stable   - Continue home medication famotidine 20 mg daily      Diet: Regular  DVT px: On Eliquis for aflutter  Code status: Full Code  Dispo: Home when clinically improved and inpatient workup is complete

## 2022-11-18 NOTE — PROGRESS NOTE ADULT - SUBJECTIVE AND OBJECTIVE BOX
HPI:  70 y/o female with PMHx of  MGUS,  atrial myxoma s/p resection of myxoma on 11/8/2022   Pt was discharged 11/14 and took her heart rate from her watch it read 114-124 BPM. She went for urgent visit at Dr. Braun's office who gave her Metoprolol and Eliquis.  She  presented  to  with complaints of SOB on 11/15.  She was found to be in Atrial flutter with upto 5.6 sec conversion pause to junctional rhythm, during sleep on tele. Pt underwent  MOHINI/CV on 11/16 -MOHINI showed echogenic mass which was unlikely to be a vegetation, possibly representing a partial redundant   membrane. After cardioversion pt had few beats of SR but remained in Aflutter.  PO amiodarone started on 11/16.    Pt is OOB, denies chest pain/SOB/palpitations at rest, but c/o 'feeling fluttering sensation in the chest' with activity.  Tele: AFlutter 120-130's bpm, no further pauses overnight    11/18/22: pt is OOB, ambulating, feeling better, denies chest pain/SOB/palpitations  tele: Aflutter 's bpm, no further pauses    ROS: All other ROS is negative unless indicated above.    Physical Exam:  Vital Signs Last 24 Hrs  T(C): 36.8 (18 Nov 2022 07:54), Max: 36.8 (18 Nov 2022 07:54)  T(F): 98.3 (18 Nov 2022 07:54), Max: 98.3 (18 Nov 2022 07:54)  HR: 114 (18 Nov 2022 07:54) (98 - 117)  BP: 104/62 (18 Nov 2022 07:54) (104/62 - 114/71)  RR: 18 (18 Nov 2022 07:54) (18 - 18)  SpO2: 100% (18 Nov 2022 07:54) (100% - 100%)    Parameters below as of 18 Nov 2022 07:54  Patient On (Oxygen Delivery Method): room air      Constitutional: well developed, no deformities and no acute distress    Neurological: Alert & Oriented x 3, ALCOCER, no focal deficits    HEENT: NC/AT, PERRLA, EOMI,  Neck supple.    Respiratory: CTA B/L, No wheezing/crackles/rhonchi    Cardiovascular: (+) S1 & S2,     Gastrointestinal: soft, NT, nondistended, (+) BS    Genitourinary: non distended bladder, voiding freely    Extremities: No pedal edema, No clubbing, No cyanosis    Skin:  mid sternal incision: C/D/I            Allergies    No Known Allergies    Intolerances      MEDICATIONS  (STANDING):  aMIOdarone    Tablet   Oral   aMIOdarone    Tablet 400 milliGRAM(s) Oral every 8 hours  apixaban 5 milliGRAM(s) Oral two times a day  cholecalciferol 1000 Unit(s) Oral daily  famotidine    Tablet 20 milliGRAM(s) Oral daily  magnesium oxide 200 milliGRAM(s) Oral daily  metoprolol tartrate 12.5 milliGRAM(s) Oral two times a day  multivitamin 1 Tablet(s) Oral daily  polyethylene glycol 3350 17 Gram(s) Oral daily  senna 2 Tablet(s) Oral at bedtime    MEDICATIONS  (PRN):  acetaminophen     Tablet .. 650 milliGRAM(s) Oral every 6 hours PRN Temp greater or equal to 38C (100.4F), Mild Pain (1 - 3)  aluminum hydroxide/magnesium hydroxide/simethicone Suspension 30 milliLiter(s) Oral every 4 hours PRN Dyspepsia  bisacodyl 5 milliGRAM(s) Oral every 12 hours PRN Constipation  diltiazem Injectable 10 milliGRAM(s) IV Push every 4 hours PRN HR>130  melatonin 3 milliGRAM(s) Oral at bedtime PRN Insomnia  ondansetron Injectable 4 milliGRAM(s) IV Push every 6 hours PRN Nausea and/or Vomiting    LABS:    11-18    133<L>  |  99  |  12  ----------------------------<  109<H>  3.8   |  29  |  0.67    Ca    9.7      18 Nov 2022 06:34  Phos  3.2     11-18  Mg     2.4     11-18

## 2022-11-19 VITALS
HEART RATE: 106 BPM | DIASTOLIC BLOOD PRESSURE: 70 MMHG | TEMPERATURE: 98 F | RESPIRATION RATE: 18 BRPM | OXYGEN SATURATION: 100 % | SYSTOLIC BLOOD PRESSURE: 108 MMHG

## 2022-11-19 LAB
ADD ON TEST-SPECIMEN IN LAB: SIGNIFICANT CHANGE UP
ANION GAP SERPL CALC-SCNC: 5 MMOL/L — SIGNIFICANT CHANGE UP (ref 5–17)
BUN SERPL-MCNC: 12 MG/DL — SIGNIFICANT CHANGE UP (ref 7–23)
CALCIUM SERPL-MCNC: 9.1 MG/DL — SIGNIFICANT CHANGE UP (ref 8.5–10.1)
CHLORIDE SERPL-SCNC: 100 MMOL/L — SIGNIFICANT CHANGE UP (ref 96–108)
CO2 SERPL-SCNC: 28 MMOL/L — SIGNIFICANT CHANGE UP (ref 22–31)
CORTIS AM PEAK SERPL-MCNC: 22.2 UG/DL — HIGH (ref 6–18.4)
CREAT SERPL-MCNC: 0.61 MG/DL — SIGNIFICANT CHANGE UP (ref 0.5–1.3)
EGFR: 97 ML/MIN/1.73M2 — SIGNIFICANT CHANGE UP
GLUCOSE SERPL-MCNC: 114 MG/DL — HIGH (ref 70–99)
HCT VFR BLD CALC: 28.1 % — LOW (ref 34.5–45)
HGB BLD-MCNC: 9.6 G/DL — LOW (ref 11.5–15.5)
MAGNESIUM SERPL-MCNC: 2.2 MG/DL — SIGNIFICANT CHANGE UP (ref 1.6–2.6)
MCHC RBC-ENTMCNC: 31.1 PG — SIGNIFICANT CHANGE UP (ref 27–34)
MCHC RBC-ENTMCNC: 34.2 GM/DL — SIGNIFICANT CHANGE UP (ref 32–36)
MCV RBC AUTO: 90.9 FL — SIGNIFICANT CHANGE UP (ref 80–100)
OSMOLALITY UR: 420 MOSM/KG — SIGNIFICANT CHANGE UP (ref 50–1200)
PHOSPHATE SERPL-MCNC: 3.1 MG/DL — SIGNIFICANT CHANGE UP (ref 2.5–4.5)
PLATELET # BLD AUTO: 392 K/UL — SIGNIFICANT CHANGE UP (ref 150–400)
POTASSIUM SERPL-MCNC: 4 MMOL/L — SIGNIFICANT CHANGE UP (ref 3.5–5.3)
POTASSIUM SERPL-SCNC: 4 MMOL/L — SIGNIFICANT CHANGE UP (ref 3.5–5.3)
RBC # BLD: 3.09 M/UL — LOW (ref 3.8–5.2)
RBC # FLD: 13.2 % — SIGNIFICANT CHANGE UP (ref 10.3–14.5)
SODIUM SERPL-SCNC: 133 MMOL/L — LOW (ref 135–145)
SODIUM UR-SCNC: <20 MMOL/L — SIGNIFICANT CHANGE UP
WBC # BLD: 10.38 K/UL — SIGNIFICANT CHANGE UP (ref 3.8–10.5)
WBC # FLD AUTO: 10.38 K/UL — SIGNIFICANT CHANGE UP (ref 3.8–10.5)

## 2022-11-19 RX ORDER — ASPIRIN/CALCIUM CARB/MAGNESIUM 324 MG
1 TABLET ORAL
Qty: 0 | Refills: 0 | DISCHARGE

## 2022-11-19 RX ORDER — METOPROLOL TARTRATE 50 MG
0.5 TABLET ORAL
Qty: 30 | Refills: 0
Start: 2022-11-19

## 2022-11-19 RX ORDER — FUROSEMIDE 40 MG
1 TABLET ORAL
Qty: 0 | Refills: 0 | DISCHARGE

## 2022-11-19 RX ORDER — POTASSIUM CHLORIDE 20 MEQ
1 PACKET (EA) ORAL
Qty: 0 | Refills: 0 | DISCHARGE

## 2022-11-19 RX ORDER — SENNA PLUS 8.6 MG/1
2 TABLET ORAL
Qty: 28 | Refills: 0
Start: 2022-11-19

## 2022-11-19 RX ORDER — POLYETHYLENE GLYCOL 3350 17 G/17G
17 POWDER, FOR SOLUTION ORAL
Qty: 238 | Refills: 0
Start: 2022-11-19

## 2022-11-19 RX ORDER — AMIODARONE HYDROCHLORIDE 400 MG/1
2 TABLET ORAL
Qty: 38 | Refills: 0
Start: 2022-11-19

## 2022-11-19 RX ADMIN — Medication 12.5 MILLIGRAM(S): at 09:02

## 2022-11-19 RX ADMIN — APIXABAN 5 MILLIGRAM(S): 2.5 TABLET, FILM COATED ORAL at 09:01

## 2022-11-19 RX ADMIN — Medication 1 TABLET(S): at 09:01

## 2022-11-19 RX ADMIN — MAGNESIUM OXIDE 400 MG ORAL TABLET 200 MILLIGRAM(S): 241.3 TABLET ORAL at 09:01

## 2022-11-19 RX ADMIN — Medication 1000 UNIT(S): at 09:01

## 2022-11-19 RX ADMIN — AMIODARONE HYDROCHLORIDE 400 MILLIGRAM(S): 400 TABLET ORAL at 13:41

## 2022-11-19 RX ADMIN — FAMOTIDINE 20 MILLIGRAM(S): 10 INJECTION INTRAVENOUS at 09:01

## 2022-11-19 RX ADMIN — AMIODARONE HYDROCHLORIDE 400 MILLIGRAM(S): 400 TABLET ORAL at 06:05

## 2022-11-19 RX ADMIN — POLYETHYLENE GLYCOL 3350 17 GRAM(S): 17 POWDER, FOR SOLUTION ORAL at 09:02

## 2022-11-19 NOTE — PROGRESS NOTE ADULT - SUBJECTIVE AND OBJECTIVE BOX
Patient is a 71y old  Female who presents with a chief complaint of Rapid atrial flutter (18 Nov 2022 16:15)    72 y/o female with PMHx of  MGUS,  atrial myxoma s/p resection of myxoma on 11/8/2022   Pt was discharged 11/14 and took her heart rate from her watch it read 114-124 BPM. She went for urgent visit at Dr. Braun's office who gave her Metoprolol and Eliquis.  She  presented  to  with complaints of SOB on 11/15.  She was found to be in Atrial flutter with upto 5.6 sec conversion pause to junctional rhythm, during sleep on tele. Pt underwent  MOHINI/CV on 11/16 -MOHINI showed echogenic mass which was unlikely to be a vegetation, possibly representing a partial redundant   membrane. After cardioversion pt had few beats of SR but remained in Aflutter.  PO amiodarone started on 11/16.    11/19/22  Has been taking Amiodarone and now in a rate controlled Afib. Has been ambulating this am without problems.   Interested in discharge.    Echo: 11/16/22     The left atrium appears normal.   No thrombus seen in the left atrial or left atrial appendage. Normal GLO   velocity.   There is a small, echogenic, fixed mass noted on the left atrial side,   likely postop changes from recent myxoma excision and less likely   thrombus.   A pericardial effusion is not present.   Estimated left ventricular ejection fraction is 60 %. There is a small,   mobile, echogenic mass in the left ventricular outflow tract, attached to   the ventricular septum, possibly representing a partial redundant   membrane. No turbulence noted. No aortic regurgitation noted.   The patient underwent synchronized cardioversion at 150 J x 1, and 200 J   x   2. Sinus rhythm was restored and with each attempt, however not maintain.   Recommend medical therapy with anticoagulation and antiarrhythmic   therapy.        Allergies    No Known Allergies    Intolerances        MEDICATIONS  (STANDING):  aMIOdarone    Tablet   Oral   aMIOdarone    Tablet 400 milliGRAM(s) Oral every 8 hours  apixaban 5 milliGRAM(s) Oral two times a day  cholecalciferol 1000 Unit(s) Oral daily  famotidine    Tablet 20 milliGRAM(s) Oral daily  magnesium oxide 200 milliGRAM(s) Oral daily  metoprolol tartrate 12.5 milliGRAM(s) Oral two times a day  multivitamin 1 Tablet(s) Oral daily  polyethylene glycol 3350 17 Gram(s) Oral daily  senna 2 Tablet(s) Oral at bedtime    MEDICATIONS  (PRN):  acetaminophen     Tablet .. 650 milliGRAM(s) Oral every 6 hours PRN Temp greater or equal to 38C (100.4F), Mild Pain (1 - 3)  aluminum hydroxide/magnesium hydroxide/simethicone Suspension 30 milliLiter(s) Oral every 4 hours PRN Dyspepsia  bisacodyl 5 milliGRAM(s) Oral every 12 hours PRN Constipation  diltiazem Injectable 10 milliGRAM(s) IV Push every 4 hours PRN HR>130  melatonin 3 milliGRAM(s) Oral at bedtime PRN Insomnia  ondansetron Injectable 4 milliGRAM(s) IV Push every 6 hours PRN Nausea and/or Vomiting    REVIEW OF SYSTEMS:    RESPIRATORY: No cough, wheezing, hemoptysis; No shortness of breath  CARDIOVASCULAR: No chest pain or palpitations  All other review of systems is negative unless indicated above      PHYSICAL EXAM:  Daily     Daily   Vital Signs Last 24 Hrs  T(C): 36.4 (19 Nov 2022 08:04), Max: 36.9 (18 Nov 2022 19:58)  T(F): 97.5 (19 Nov 2022 08:04), Max: 98.4 (18 Nov 2022 19:58)  HR: 106 (19 Nov 2022 08:04) (95 - 107)  BP: 108/70 (19 Nov 2022 08:04) (103/63 - 110/61)  BP(mean): --  RR: 18 (19 Nov 2022 08:04) (18 - 18)  SpO2: 100% (19 Nov 2022 08:04) (100% - 100%)    Parameters below as of 19 Nov 2022 08:04  Patient On (Oxygen Delivery Method): room air        Constitutional: NAD, awake and alert, well-developed  HEENT: PERR, EOMI, Normal Hearing, MMM  Neck: Soft and supple, No LAD, No JVD  Respiratory: Breath sounds are clear bilaterally, No wheezing, rales or rhonchi  Cardiovascular: S1 and S2, irregular rate and rhythm, no Murmurs, gallops or rubs  Gastrointestinal: Bowel Sounds present, soft, nontender, nondistended, no guarding, no rebound  Extremities: No peripheral edema  Vascular: 2+ peripheral pulses  Neurological: A/O x 3, no focal deficits  Musculoskeletal: 5/5 strength b/l upper and lower extremities  Skin: No rashes    LABS: All Labs Reviewed:                        9.6    10.38 )-----------( 392      ( 19 Nov 2022 06:48 )             28.1     11-19    133<L>  |  100  |  12  ----------------------------<  114<H>  4.0   |  28  |  0.61    Ca    9.1      19 Nov 2022 06:48  Phos  3.1     11-19  Mg     2.2     11-19      TELEMETRY/EKG: rate controlled Afib

## 2022-11-19 NOTE — PROGRESS NOTE ADULT - REASON FOR ADMISSION
Aflutter s/p recent heart surgery, Elevated Troponins
Rapid atrial flutter
Aflutter s/p recent heart surgery, Elevated Troponin
Aflutter s/p recent heart surgery, Elevated Troponins
Aflutter s/p recent heart surgery, Elevated Troponins
Atrial flutter
Aflutter s/p recent heart surgery, Elevated Troponin
Aflutter s/p recent heart surgery, Elevated Troponins
Rapid atrial flutter

## 2022-11-19 NOTE — PROGRESS NOTE ADULT - ASSESSMENT
· Assessment	  72 yo F with above PMHx, recent post-op of mixoma resection, presented with AFlutter 120-130's bpm  s/p failed DCCV on 11/16/22.  -Continue  Eliquis 5mg bid   -Amiodarone po loading started on 11/16-remains in AF  Information sheet on Amiodarone and potential adverse effects were discussed and patients questions answered    - avoid concomitant use of digoxin while on  amiodarone  -  metoprolol 12.5mg PO BID   - consider repeat DCCV once loaded with amiodarone (for at least 4-5 days) as outpt with Dr Menjivar/ Aparna  - maintain K+>4.0, Mg++>2.0  -consider MCOT on discharge

## 2022-11-19 NOTE — PROGRESS NOTE ADULT - SUBJECTIVE AND OBJECTIVE BOX
ELECTROPHYSIOLOGY  PROGRESS NOTE  Reason for follow up: A flutter/ AF  Overnight: No new events. Remains in AF HR 80s  Update: On amio loading-in AF HR improved 80s    Subjective: "  _____I want to go home_________________"    General: No fatigue, no fevers/chills  Respiratory: No dyspnea, no cough, no wheeze  CV: No chest pain, no palpitations  Abd: No nausea  Neuro: No headache, no dizziness  	  Vitals:  T(C): 36.4 (11-19-22 @ 08:04), Max: 36.9 (11-18-22 @ 19:58)  HR: 106 (11-19-22 @ 08:04) (95 - 107)  BP: 108/70 (11-19-22 @ 08:04) (103/63 - 110/61)  RR: 18 (11-19-22 @ 08:04) (18 - 18)  SpO2: 100% (11-19-22 @ 08:04) (100% - 100%)  Wt(kg): --  I&O's Summary    Weight (kg): 51.2 (11-15 @ 18:29)      PHYSICAL EXAM:  Appearance: Comfortable. No acute distress  HEENT:  Head and neck: Atraumatic. Normocephalic.  Normal oral mucosa, PERRL, Neck is supple. No JVD, No carotid bruit.   Neurologic: A & O x 3, no focal deficits. EOMI.  Lymphatic: No cervical lymphadenopathy  Cardiovascular: Normal S1 S2, No murmur, rubs/gallops. No JVD, No edema  Respiratory: Lungs clear to auscultation  Gastrointestinal:  Soft, Non-tender, + BS  Lower Extremities: No edema  Psychiatry: Patient is calm. No agitation. Mood & affect appropriate  Skin: No rashes/ ecchymoses/cyanosis/ulcers visualized on the face, hands or feet.      CURRENT MEDICATIONS:  aMIOdarone    Tablet   Oral   aMIOdarone    Tablet 400 milliGRAM(s) Oral every 8 hours  diltiazem Injectable 10 milliGRAM(s) IV Push every 4 hours PRN  metoprolol tartrate 12.5 milliGRAM(s) Oral two times a day    famotidine    Tablet  polyethylene glycol 3350  senna  apixaban  cholecalciferol  magnesium oxide  multivitamin      DIAGNOSTIC TESTING:  [ ] Echocardiogram:   [ ]  Catheterization:  [ ] Stress Test:    OTHER: 	      LABS:	 	                            9.6    10.38 )-----------( 392      ( 19 Nov 2022 06:48 )             28.1     11-19    133<L>  |  100  |  12  ----------------------------<  114<H>  4.0   |  28  |  0.61    Ca    9.1      19 Nov 2022 06:48  Phos  3.1     11-19  Mg     2.2     11-19      TSH: Thyroid Stimulating Hormone, Serum: 3.28 uU/mL        TELEMETRY: Reviewed  AF hr 80s

## 2022-11-19 NOTE — PROGRESS NOTE ADULT - ASSESSMENT
72 y/o female with PMHx of  MGUS,  atrial myxoma s/p resection of myxoma on 11/8/2022 presents with atrial flutter.     11/19/22  Would continue amiodarone and consider discharge home. she can have a cardioversion with Dr Menjivar/Robin as an outpatient.

## 2022-11-19 NOTE — PROGRESS NOTE ADULT - PROVIDER SPECIALTY LIST ADULT
Electrophysiology
Hospitalist
Cardiology
Electrophysiology
Electrophysiology
Hospitalist
Hospitalist

## 2022-11-21 PROBLEM — D15.1 BENIGN NEOPLASM OF HEART: Chronic | Status: ACTIVE | Noted: 2022-11-15

## 2022-11-21 PROBLEM — M81.0 AGE-RELATED OSTEOPOROSIS WITHOUT CURRENT PATHOLOGICAL FRACTURE: Chronic | Status: ACTIVE | Noted: 2022-11-15

## 2022-11-21 PROBLEM — Z00.00 ENCOUNTER FOR PREVENTIVE HEALTH EXAMINATION: Status: ACTIVE | Noted: 2022-11-21

## 2022-11-21 PROBLEM — E04.1 NONTOXIC SINGLE THYROID NODULE: Chronic | Status: ACTIVE | Noted: 2022-11-15

## 2022-12-01 DIAGNOSIS — I97.89 OTHER POSTPROCEDURAL COMPLICATIONS AND DISORDERS OF THE CIRCULATORY SYSTEM, NOT ELSEWHERE CLASSIFIED: ICD-10-CM

## 2022-12-01 DIAGNOSIS — M81.0 AGE-RELATED OSTEOPOROSIS WITHOUT CURRENT PATHOLOGICAL FRACTURE: ICD-10-CM

## 2022-12-01 DIAGNOSIS — Z20.822 CONTACT WITH AND (SUSPECTED) EXPOSURE TO COVID-19: ICD-10-CM

## 2022-12-01 DIAGNOSIS — E78.5 HYPERLIPIDEMIA, UNSPECIFIED: ICD-10-CM

## 2022-12-01 DIAGNOSIS — E83.52 HYPERCALCEMIA: ICD-10-CM

## 2022-12-01 DIAGNOSIS — E22.2 SYNDROME OF INAPPROPRIATE SECRETION OF ANTIDIURETIC HORMONE: ICD-10-CM

## 2022-12-01 DIAGNOSIS — D64.89 OTHER SPECIFIED ANEMIAS: ICD-10-CM

## 2022-12-01 DIAGNOSIS — K21.9 GASTRO-ESOPHAGEAL REFLUX DISEASE WITHOUT ESOPHAGITIS: ICD-10-CM

## 2022-12-01 DIAGNOSIS — Z79.82 LONG TERM (CURRENT) USE OF ASPIRIN: ICD-10-CM

## 2022-12-01 DIAGNOSIS — I48.92 UNSPECIFIED ATRIAL FLUTTER: ICD-10-CM

## 2022-12-01 DIAGNOSIS — I48.0 PAROXYSMAL ATRIAL FIBRILLATION: ICD-10-CM

## 2022-12-30 ENCOUNTER — APPOINTMENT (OUTPATIENT)
Dept: ELECTROPHYSIOLOGY | Facility: CLINIC | Age: 72
End: 2022-12-30

## 2023-01-31 ENCOUNTER — OFFICE (OUTPATIENT)
Dept: URBAN - METROPOLITAN AREA CLINIC 109 | Facility: CLINIC | Age: 73
Setting detail: OPHTHALMOLOGY
End: 2023-01-31
Payer: MEDICARE

## 2023-01-31 DIAGNOSIS — H40.033: ICD-10-CM

## 2023-01-31 PROCEDURE — 92250 FUNDUS PHOTOGRAPHY W/I&R: CPT | Performed by: OPHTHALMOLOGY

## 2023-01-31 PROCEDURE — 92083 EXTENDED VISUAL FIELD XM: CPT | Performed by: OPHTHALMOLOGY

## 2023-01-31 PROCEDURE — 92020 GONIOSCOPY: CPT | Performed by: OPHTHALMOLOGY

## 2023-01-31 PROCEDURE — 99213 OFFICE O/P EST LOW 20 MIN: CPT | Performed by: OPHTHALMOLOGY

## 2023-01-31 ASSESSMENT — REFRACTION_AUTOREFRACTION
OS_CYLINDER: -1.00
OD_CYLINDER: -0.75
OS_SPHERE: +1.00
OD_AXIS: 90
OS_AXIS: 92
OD_SPHERE: +1.25

## 2023-01-31 ASSESSMENT — SPHEQUIV_DERIVED
OD_SPHEQUIV: 0.875
OS_SPHEQUIV: 0.5

## 2023-01-31 ASSESSMENT — CONFRONTATIONAL VISUAL FIELD TEST (CVF)
OD_FINDINGS: FULL
OS_FINDINGS: FULL

## 2023-01-31 ASSESSMENT — CORNEAL DYSTROPHY - POSTERIOR
OD_POSTERIORDYSTROPHY: T FUCHS GUTTATA
OS_POSTERIORDYSTROPHY: T FUCHS GUTTATA

## 2023-01-31 ASSESSMENT — PACHYMETRY
OS_CT_CORRECTION: -5
OD_CT_CORRECTION: -4
OD_CT_UM: 600
OS_CT_UM: 611

## 2023-01-31 ASSESSMENT — TONOMETRY
OS_IOP_MMHG: 13
OD_IOP_MMHG: 13

## 2023-01-31 ASSESSMENT — SUPERFICIAL PUNCTATE KERATITIS (SPK)
OS_SPK: 1+
OD_SPK: 1+

## 2023-01-31 ASSESSMENT — REFRACTION_MANIFEST
OS_SPHERE: PLANO
OS_VA1: 20/25-

## 2023-08-16 ENCOUNTER — OFFICE (OUTPATIENT)
Dept: URBAN - METROPOLITAN AREA CLINIC 109 | Facility: CLINIC | Age: 73
Setting detail: OPHTHALMOLOGY
End: 2023-08-16
Payer: MEDICARE

## 2023-08-16 DIAGNOSIS — H40.033: ICD-10-CM

## 2023-08-16 DIAGNOSIS — H52.7: ICD-10-CM

## 2023-08-16 DIAGNOSIS — H52.03: ICD-10-CM

## 2023-08-16 PROCEDURE — 92083 EXTENDED VISUAL FIELD XM: CPT | Performed by: OPHTHALMOLOGY

## 2023-08-16 PROCEDURE — 92015 DETERMINE REFRACTIVE STATE: CPT | Performed by: OPHTHALMOLOGY

## 2023-08-16 PROCEDURE — 99213 OFFICE O/P EST LOW 20 MIN: CPT | Performed by: OPHTHALMOLOGY

## 2023-08-16 ASSESSMENT — REFRACTION_MANIFEST
OS_SPHERE: PLANO
OD_AXIS: 89
OD_SPHERE: +1.00
OS_AXIS: 96
OD_CYLINDER: -0.25
OS_CYLINDER: -0.75
OS_VA1: 20/25-
OS_SPHERE: +1.00

## 2023-08-16 ASSESSMENT — SPHEQUIV_DERIVED
OS_SPHEQUIV: 0.625
OD_SPHEQUIV: 0.875
OS_SPHEQUIV: 0.375
OD_SPHEQUIV: 0.625

## 2023-08-16 ASSESSMENT — SUPERFICIAL PUNCTATE KERATITIS (SPK)
OD_SPK: 1+
OS_SPK: 1+

## 2023-08-16 ASSESSMENT — KERATOMETRY
OS_K1POWER_DIOPTERS: 41.62
OD_AXISANGLE_DEGREES: 173
OS_K2POWER_DIOPTERS: 42.00
OD_K2POWER_DIOPTERS: 42.00
OD_K1POWER_DIOPTERS: 41.87
OS_AXISANGLE_DEGREES: 27

## 2023-08-16 ASSESSMENT — AXIALLENGTH_DERIVED
OD_AL: 23.83
OD_AL: 23.928
OS_AL: 24.08
OS_AL: 23.98

## 2023-08-16 ASSESSMENT — PACHYMETRY
OS_CT_UM: 611
OD_CT_CORRECTION: -4
OD_CT_UM: 600
OS_CT_CORRECTION: -5

## 2023-08-16 ASSESSMENT — TONOMETRY
OD_IOP_MMHG: 16
OS_IOP_MMHG: 16

## 2023-08-16 ASSESSMENT — REFRACTION_AUTOREFRACTION
OS_SPHERE: +1.00
OS_CYLINDER: -1.25
OD_CYLINDER: -0.75
OD_AXIS: 089
OD_SPHERE: +1.00
OS_AXIS: 097

## 2023-08-16 ASSESSMENT — CONFRONTATIONAL VISUAL FIELD TEST (CVF)
OS_FINDINGS: FULL
OD_FINDINGS: FULL

## 2023-08-16 ASSESSMENT — CORNEAL DYSTROPHY - POSTERIOR
OS_POSTERIORDYSTROPHY: T FUCHS GUTTATA
OD_POSTERIORDYSTROPHY: T FUCHS GUTTATA

## 2023-08-16 ASSESSMENT — VISUAL ACUITY
OS_BCVA: 20/30+1
OD_BCVA: 20/20-1

## 2024-02-20 ENCOUNTER — OFFICE (OUTPATIENT)
Dept: URBAN - METROPOLITAN AREA CLINIC 109 | Facility: CLINIC | Age: 74
Setting detail: OPHTHALMOLOGY
End: 2024-02-20
Payer: MEDICARE

## 2024-02-20 DIAGNOSIS — H43.393: ICD-10-CM

## 2024-02-20 DIAGNOSIS — H35.373: ICD-10-CM

## 2024-02-20 DIAGNOSIS — H40.033: ICD-10-CM

## 2024-02-20 PROCEDURE — 92250 FUNDUS PHOTOGRAPHY W/I&R: CPT | Performed by: OPHTHALMOLOGY

## 2024-02-20 PROCEDURE — 92083 EXTENDED VISUAL FIELD XM: CPT | Performed by: OPHTHALMOLOGY

## 2024-02-20 PROCEDURE — 92014 COMPRE OPH EXAM EST PT 1/>: CPT | Performed by: OPHTHALMOLOGY

## 2024-02-20 ASSESSMENT — REFRACTION_AUTOREFRACTION
OD_AXIS: 099
OS_AXIS: 101
OD_CYLINDER: -1.00
OS_CYLINDER: -1.25
OD_SPHERE: +2.00
OS_SPHERE: +1.50

## 2024-02-20 ASSESSMENT — REFRACTION_MANIFEST
OS_VA1: 20/25-
OS_AXIS: 96
OD_CYLINDER: -0.25
OD_AXIS: 89
OD_SPHERE: +1.00
OS_SPHERE: PLANO
OS_SPHERE: +1.00
OS_CYLINDER: -0.75

## 2024-02-20 ASSESSMENT — SUPERFICIAL PUNCTATE KERATITIS (SPK)
OD_SPK: 1+
OS_SPK: 1+

## 2024-02-20 ASSESSMENT — CORNEAL DYSTROPHY - POSTERIOR
OS_POSTERIORDYSTROPHY: T FUCHS GUTTATA
OD_POSTERIORDYSTROPHY: T FUCHS GUTTATA

## 2024-02-20 ASSESSMENT — CONFRONTATIONAL VISUAL FIELD TEST (CVF)
OS_FINDINGS: FULL
OD_FINDINGS: FULL

## 2024-02-20 ASSESSMENT — SPHEQUIV_DERIVED
OS_SPHEQUIV: 0.625
OS_SPHEQUIV: 0.875
OD_SPHEQUIV: 0.875
OD_SPHEQUIV: 1.5

## 2024-04-05 NOTE — PROGRESS NOTE ADULT - SUBJECTIVE AND OBJECTIVE BOX
CURRENT CARDIAC WORKUP:       Echo:  Stress Test:  Cardiac Cath:    Allergies:   No Known Allergies      MEDICATIONS  (STANDING):  aMIOdarone    Tablet   Oral   aMIOdarone    Tablet 400 milliGRAM(s) Oral every 8 hours  apixaban 5 milliGRAM(s) Oral two times a day  cholecalciferol 1000 Unit(s) Oral daily  famotidine    Tablet 20 milliGRAM(s) Oral daily  magnesium oxide 200 milliGRAM(s) Oral daily  metoprolol tartrate 12.5 milliGRAM(s) Oral two times a day  multivitamin 1 Tablet(s) Oral daily  polyethylene glycol 3350 17 Gram(s) Oral daily  senna 2 Tablet(s) Oral at bedtime    MEDICATIONS  (PRN):  acetaminophen     Tablet .. 650 milliGRAM(s) Oral every 6 hours PRN Temp greater or equal to 38C (100.4F), Mild Pain (1 - 3)  aluminum hydroxide/magnesium hydroxide/simethicone Suspension 30 milliLiter(s) Oral every 4 hours PRN Dyspepsia  bisacodyl 5 milliGRAM(s) Oral every 12 hours PRN Constipation  diltiazem Injectable 10 milliGRAM(s) IV Push every 4 hours PRN HR>130  melatonin 3 milliGRAM(s) Oral at bedtime PRN Insomnia  ondansetron Injectable 4 milliGRAM(s) IV Push every 6 hours PRN Nausea and/or Vomiting      ROS:     .ros      Vital Signs Last 24 Hrs  T(C): 36.4 (17 Nov 2022 19:36), Max: 36.4 (17 Nov 2022 19:36)  T(F): 97.6 (17 Nov 2022 19:36), Max: 97.6 (17 Nov 2022 19:36)  HR: 98 (18 Nov 2022 05:34) (98 - 120)  BP: 108/70 (18 Nov 2022 05:34) (100/54 - 114/71)  BP(mean): --  RR: 18 (17 Nov 2022 19:36) (18 - 18)  SpO2: 100% (17 Nov 2022 19:36) (100% - 100%)    Parameters below as of 17 Nov 2022 19:36  Patient On (Oxygen Delivery Method): room air        I&O's Summary      PHYSICAL EXAM:    .phy      TELEMETRY:    ECG:    LABS:    11-18    133<L>  |  99  |  12  ----------------------------<  109<H>  3.8   |  29  |  0.67    Ca    9.7      18 Nov 2022 06:34  Phos  3.2     11-18  Mg     2.4     11-18      RADIOLOGY & ADDITIONAL STUDIES:     Continue statin

## 2025-01-23 NOTE — ED ADULT NURSE NOTE - TEMPLATE
Detail Level: Detailed Quality 226: Preventive Care And Screening: Tobacco Use: Screening And Cessation Intervention: Patient screened for tobacco use and is an ex/non-smoker Quality 486: Dermatitis - Improvement In Patient-Reported Itch Severity: Itch severity assessment score is reduced by 3 or more points from the initial (index) assessment score to the follow-up visit score Cardiac

## 2025-03-31 ENCOUNTER — OFFICE (OUTPATIENT)
Dept: URBAN - METROPOLITAN AREA CLINIC 109 | Facility: CLINIC | Age: 75
Setting detail: OPHTHALMOLOGY
End: 2025-03-31
Payer: MEDICARE

## 2025-03-31 DIAGNOSIS — H40.033: ICD-10-CM

## 2025-03-31 PROCEDURE — 92133 CPTRZD OPH DX IMG PST SGM ON: CPT | Performed by: OPHTHALMOLOGY

## 2025-03-31 PROCEDURE — 92012 INTRM OPH EXAM EST PATIENT: CPT | Performed by: OPHTHALMOLOGY

## 2025-03-31 PROCEDURE — 92083 EXTENDED VISUAL FIELD XM: CPT | Performed by: OPHTHALMOLOGY

## 2025-03-31 ASSESSMENT — VISUAL ACUITY
OD_BCVA: 20/25-2
OS_BCVA: 20/20-1

## 2025-03-31 ASSESSMENT — TONOMETRY
OS_IOP_MMHG: 18
OS_IOP_MMHG: 16
OD_IOP_MMHG: 18
OD_IOP_MMHG: 16

## 2025-03-31 ASSESSMENT — REFRACTION_MANIFEST
OD_SPHERE: +1.00
OS_AXIS: 96
OS_SPHERE: PLANO
OD_AXIS: 89
OS_VA1: 20/25-
OS_CYLINDER: -0.75
OD_CYLINDER: -0.25
OS_SPHERE: +1.00

## 2025-03-31 ASSESSMENT — PACHYMETRY
OS_CT_UM: 611
OS_CT_CORRECTION: -5
OD_CT_UM: 600
OD_CT_CORRECTION: -4

## 2025-03-31 ASSESSMENT — CONFRONTATIONAL VISUAL FIELD TEST (CVF)
OS_FINDINGS: FULL
OD_FINDINGS: FULL